# Patient Record
Sex: FEMALE | Race: WHITE | NOT HISPANIC OR LATINO | Employment: OTHER | RURAL
[De-identification: names, ages, dates, MRNs, and addresses within clinical notes are randomized per-mention and may not be internally consistent; named-entity substitution may affect disease eponyms.]

---

## 2022-07-06 ENCOUNTER — OFFICE VISIT (OUTPATIENT)
Dept: FAMILY MEDICINE CLINIC | Facility: CLINIC | Age: 62
End: 2022-07-06

## 2022-07-06 VITALS
OXYGEN SATURATION: 97 % | BODY MASS INDEX: 33.11 KG/M2 | HEIGHT: 66 IN | HEART RATE: 68 BPM | SYSTOLIC BLOOD PRESSURE: 126 MMHG | WEIGHT: 206 LBS | DIASTOLIC BLOOD PRESSURE: 80 MMHG

## 2022-07-06 DIAGNOSIS — E56.9 VITAMIN DEFICIENCY: ICD-10-CM

## 2022-07-06 DIAGNOSIS — E11.9 TYPE 2 DIABETES MELLITUS WITHOUT COMPLICATION, WITHOUT LONG-TERM CURRENT USE OF INSULIN: ICD-10-CM

## 2022-07-06 DIAGNOSIS — Z72.0 TOBACCO ABUSE: ICD-10-CM

## 2022-07-06 DIAGNOSIS — E78.2 MIXED HYPERLIPIDEMIA: ICD-10-CM

## 2022-07-06 DIAGNOSIS — R53.83 FATIGUE, UNSPECIFIED TYPE: ICD-10-CM

## 2022-07-06 DIAGNOSIS — J30.1 ALLERGIC RHINITIS DUE TO POLLEN, UNSPECIFIED SEASONALITY: ICD-10-CM

## 2022-07-06 DIAGNOSIS — I10 PRIMARY HYPERTENSION: Primary | ICD-10-CM

## 2022-07-06 PROBLEM — K57.92 DIVERTICULITIS: Status: ACTIVE | Noted: 2021-09-15

## 2022-07-06 PROBLEM — B37.0 CANDIDIASIS OF MOUTH: Status: ACTIVE | Noted: 2021-10-11

## 2022-07-06 PROBLEM — E66.9 OBESITY: Status: ACTIVE | Noted: 2019-11-22

## 2022-07-06 PROBLEM — N28.9 RENAL DISEASE: Status: ACTIVE | Noted: 2022-01-13

## 2022-07-06 PROBLEM — Z78.9 GOOD TOLERANCE FOR ACTIVITY: Status: ACTIVE | Noted: 2022-01-13

## 2022-07-06 PROBLEM — E78.5 HYPERLIPIDEMIA: Status: ACTIVE | Noted: 2019-10-31

## 2022-07-06 PROBLEM — K21.9 GASTROESOPHAGEAL REFLUX DISEASE: Status: ACTIVE | Noted: 2022-01-13

## 2022-07-06 PROBLEM — R91.8 MULTIPLE NODULES OF LUNG: Status: ACTIVE | Noted: 2020-12-17

## 2022-07-06 PROBLEM — Z98.890 S/P COLOSTOMY TAKEDOWN: Status: ACTIVE | Noted: 2022-03-03

## 2022-07-06 PROBLEM — F17.200 NICOTINE DEPENDENCE: Status: ACTIVE | Noted: 2020-01-13

## 2022-07-06 PROBLEM — I25.10 CORONARY ARTERIOSCLEROSIS: Status: ACTIVE | Noted: 2019-10-31

## 2022-07-06 PROCEDURE — 99214 OFFICE O/P EST MOD 30 MIN: CPT | Performed by: NURSE PRACTITIONER

## 2022-07-06 RX ORDER — BISACODYL 5 MG/1
TABLET, DELAYED RELEASE ORAL
COMMUNITY
End: 2022-07-06

## 2022-07-06 RX ORDER — FAMOTIDINE 20 MG/1
TABLET, FILM COATED ORAL
COMMUNITY
End: 2022-07-06

## 2022-07-06 RX ORDER — SCOLOPAMINE TRANSDERMAL SYSTEM 1 MG/1
PATCH, EXTENDED RELEASE TRANSDERMAL
COMMUNITY
End: 2022-07-06

## 2022-07-06 RX ORDER — LOSARTAN POTASSIUM 100 MG/1
TABLET ORAL
COMMUNITY
End: 2022-07-06

## 2022-07-06 RX ORDER — ATORVASTATIN CALCIUM 10 MG/1
TABLET, FILM COATED ORAL
COMMUNITY
End: 2022-07-06 | Stop reason: SDUPTHER

## 2022-07-06 RX ORDER — CHLORTHALIDONE 25 MG/1
25 TABLET ORAL DAILY
Qty: 90 TABLET | Refills: 1 | Status: SHIPPED | OUTPATIENT
Start: 2022-07-06 | End: 2023-02-16 | Stop reason: SDUPTHER

## 2022-07-06 RX ORDER — AMLODIPINE BESYLATE 5 MG/1
TABLET ORAL
COMMUNITY
End: 2022-07-06

## 2022-07-06 RX ORDER — VARENICLINE TARTRATE 1 MG/1
1 TABLET, FILM COATED ORAL 2 TIMES DAILY
Qty: 56 TABLET | Refills: 1 | Status: SHIPPED | OUTPATIENT
Start: 2022-08-03 | End: 2022-09-27

## 2022-07-06 RX ORDER — ONDANSETRON 4 MG/1
TABLET, ORALLY DISINTEGRATING ORAL
COMMUNITY
End: 2022-07-06

## 2022-07-06 RX ORDER — IRBESARTAN 300 MG/1
300 TABLET ORAL DAILY
COMMUNITY
Start: 2022-05-31 | End: 2022-07-06 | Stop reason: SDUPTHER

## 2022-07-06 RX ORDER — MONTELUKAST SODIUM 10 MG/1
10 TABLET ORAL NIGHTLY
Qty: 90 TABLET | Refills: 1 | Status: SHIPPED | OUTPATIENT
Start: 2022-07-06 | End: 2023-02-16 | Stop reason: SDUPTHER

## 2022-07-06 RX ORDER — IRBESARTAN 300 MG/1
300 TABLET ORAL DAILY
Qty: 90 TABLET | Refills: 1 | Status: SHIPPED | OUTPATIENT
Start: 2022-07-06 | End: 2022-11-16

## 2022-07-06 RX ORDER — MONTELUKAST SODIUM 10 MG/1
TABLET ORAL
COMMUNITY
End: 2022-07-06 | Stop reason: SDUPTHER

## 2022-07-06 RX ORDER — ATORVASTATIN CALCIUM 10 MG/1
10 TABLET, FILM COATED ORAL DAILY
Qty: 90 TABLET | Refills: 1 | Status: SHIPPED | OUTPATIENT
Start: 2022-07-06 | End: 2023-02-16 | Stop reason: SDUPTHER

## 2022-07-06 RX ORDER — METHOCARBAMOL 500 MG/1
500 TABLET, FILM COATED ORAL 4 TIMES DAILY
COMMUNITY
Start: 2022-03-03 | End: 2022-07-06

## 2022-07-06 RX ORDER — CHLORHEXIDINE GLUCONATE 4 G/100ML
SOLUTION TOPICAL
COMMUNITY
End: 2022-07-06

## 2022-07-06 RX ORDER — MELOXICAM 7.5 MG/1
TABLET ORAL
COMMUNITY
End: 2022-07-06

## 2022-07-06 RX ORDER — HYDROCODONE BITARTRATE AND ACETAMINOPHEN 7.5; 325 MG/1; MG/1
TABLET ORAL
COMMUNITY
End: 2022-07-06

## 2022-07-06 RX ORDER — CHLORTHALIDONE 25 MG/1
25 TABLET ORAL DAILY
COMMUNITY
Start: 2022-05-17 | End: 2022-07-06 | Stop reason: SDUPTHER

## 2022-07-06 RX ORDER — ATORVASTATIN CALCIUM 10 MG/1
10 TABLET, FILM COATED ORAL
COMMUNITY
Start: 2022-05-17 | End: 2022-07-06

## 2022-07-06 RX ORDER — POLYETHYLENE GLYCOL 3350 17 G/17G
POWDER, FOR SOLUTION ORAL
COMMUNITY
End: 2022-07-06

## 2022-07-06 NOTE — PROGRESS NOTES
"Chief Complaint  Med Refill and chantix request    Subjective          Aury Silverio presents to [facility]  Patient is here for refills on her medications.  She is doing well today with no complaints.  She would like to try Chantix for smoking cessation.  She has pain in her varicose veins in her legs at times.      Objective   Vital Signs:   /80   Pulse 68   Ht 167.6 cm (66\")   Wt 93.4 kg (206 lb)   SpO2 97%   BMI 33.25 kg/m²     Body mass index is 33.25 kg/m².    Review of Systems   Constitutional: Negative for fatigue and fever.   Respiratory: Negative for shortness of breath.    Cardiovascular: Negative for chest pain, palpitations and leg swelling.   Neurological: Negative for syncope.   Psychiatric/Behavioral: The patient is not nervous/anxious.           Current Outpatient Medications:   •  atorvastatin (LIPITOR) 10 MG tablet, Take 1 tablet by mouth Daily., Disp: 90 tablet, Rfl: 1  •  chlorthalidone (HYGROTON) 25 MG tablet, Take 1 tablet by mouth Daily., Disp: 90 tablet, Rfl: 1  •  irbesartan (AVAPRO) 300 MG tablet, Take 1 tablet by mouth Daily., Disp: 90 tablet, Rfl: 1  •  metFORMIN (GLUCOPHAGE) 500 MG tablet, Take 1 tablet by mouth Daily With Breakfast., Disp: 90 tablet, Rfl: 1  •  montelukast (SINGULAIR) 10 MG tablet, Take 1 tablet by mouth Every Night., Disp: 90 tablet, Rfl: 1  •  Potassium 99 MG tablet, Take  by mouth., Disp: , Rfl:   •  [START ON 8/3/2022] varenicline (Chantix Continuing Month Carter) 1 MG tablet, Take 1 tablet by mouth 2 (Two) Times a Day for 56 days., Disp: 56 tablet, Rfl: 1  •  varenicline (CHANTIX CARTER) 0.5 MG X 11 & 1 MG X 42 tablet, Take 0.5 mg po daily x 3 days, then 0.5 mg po bid x 4 days, then 1 mg po bid, Disp: 53 tablet, Rfl: 0      Allergies: Azithromycin    Physical Exam  Constitutional:       Appearance: Normal appearance.   HENT:      Head: Normocephalic.   Eyes:      Conjunctiva/sclera: Conjunctivae normal.      Pupils: Pupils " are equal, round, and reactive to light.   Cardiovascular:      Rate and Rhythm: Normal rate and regular rhythm.      Heart sounds: Normal heart sounds.   Pulmonary:      Effort: Pulmonary effort is normal.      Breath sounds: Normal breath sounds.   Abdominal:      Tenderness: There is no abdominal tenderness.   Musculoskeletal:         General: Normal range of motion.   Skin:     General: Skin is warm and dry.      Capillary Refill: Capillary refill takes less than 2 seconds.   Neurological:      General: No focal deficit present.      Mental Status: She is alert and oriented to person, place, and time.   Psychiatric:         Mood and Affect: Mood normal.         Behavior: Behavior normal.         Thought Content: Thought content normal.         Judgment: Judgment normal.          Result Review :                   Assessment and Plan    Diagnoses and all orders for this visit:    1. Primary hypertension (Primary)  Comments:  Continue current medications.  Monitor blood pressure and keep log.  We discussed diet and exercise.  Orders:  -     irbesartan (AVAPRO) 300 MG tablet; Take 1 tablet by mouth Daily.  Dispense: 90 tablet; Refill: 1  -     chlorthalidone (HYGROTON) 25 MG tablet; Take 1 tablet by mouth Daily.  Dispense: 90 tablet; Refill: 1    2. Mixed hyperlipidemia  Comments:  Continue current medications.  We discussed diet and exercise.  Return for fasting labs.  Orders:  -     atorvastatin (LIPITOR) 10 MG tablet; Take 1 tablet by mouth Daily.  Dispense: 90 tablet; Refill: 1  -     CBC & Differential; Future  -     Comprehensive Metabolic Panel; Future  -     Lipid Panel; Future    3. Allergic rhinitis due to pollen, unspecified seasonality  Comments:  Continue current medications.  Orders:  -     montelukast (SINGULAIR) 10 MG tablet; Take 1 tablet by mouth Every Night.  Dispense: 90 tablet; Refill: 1    4. Type 2 diabetes mellitus without complication, without long-term current use of insulin  ([facility])  Comments:  Continue current medications.  We discussed diet and exercise.  Return for fasting labs.  Orders:  -     metFORMIN (GLUCOPHAGE) 500 MG tablet; Take 1 tablet by mouth Daily With Breakfast.  Dispense: 90 tablet; Refill: 1  -     Hemoglobin A1c; Future    5. Vitamin deficiency  -     Vitamin B12; Future  -     Vitamin D 25 Hydroxy; Future  -     Folate; Future    6. Fatigue, unspecified type  -     TSH; Future    7. Tobacco abuse  Comments:  Chantix as directed  Orders:  -     varenicline (CHANTIX CARTER) 0.5 MG X 11 & 1 MG X 42 tablet; Take 0.5 mg po daily x 3 days, then 0.5 mg po bid x 4 days, then 1 mg po bid  Dispense: 53 tablet; Refill: 0  -     varenicline (Chantix Continuing Month Carter) 1 MG tablet; Take 1 tablet by mouth 2 (Two) Times a Day for 56 days.  Dispense: 56 tablet; Refill: 1                 Follow Up {Instructions Charge Capture  Follow-up Communications :23}  Return in about 6 months (around 1/6/2023) for Recheck.  Patient was given instructions and counseling regarding her condition or for health maintenance advice. Please see specific information pulled into the AVS if appropriate.     JUD Rizzo

## 2022-07-26 DIAGNOSIS — Z72.0 TOBACCO ABUSE: ICD-10-CM

## 2022-07-26 RX ORDER — VARENICLINE TARTRATE 1 MG/1
1 TABLET, FILM COATED ORAL 2 TIMES DAILY
Qty: 56 TABLET | Refills: 1 | OUTPATIENT
Start: 2022-07-26 | End: 2022-09-20

## 2022-09-14 ENCOUNTER — TELEPHONE (OUTPATIENT)
Dept: FAMILY MEDICINE CLINIC | Facility: CLINIC | Age: 62
End: 2022-09-14

## 2022-09-23 ENCOUNTER — TELEPHONE (OUTPATIENT)
Dept: FAMILY MEDICINE CLINIC | Facility: CLINIC | Age: 62
End: 2022-09-23

## 2022-09-27 DIAGNOSIS — Z72.0 TOBACCO ABUSE: ICD-10-CM

## 2022-09-27 RX ORDER — VARENICLINE TARTRATE 1 MG/1
1 TABLET, FILM COATED ORAL 2 TIMES DAILY
Qty: 60 TABLET | Refills: 1 | Status: SHIPPED | OUTPATIENT
Start: 2022-09-27 | End: 2022-10-20

## 2022-10-20 DIAGNOSIS — Z72.0 TOBACCO ABUSE: ICD-10-CM

## 2022-10-20 RX ORDER — VARENICLINE TARTRATE 1 MG/1
TABLET, FILM COATED ORAL
Qty: 56 TABLET | Refills: 1 | Status: SHIPPED | OUTPATIENT
Start: 2022-10-20 | End: 2022-11-12

## 2022-10-26 ENCOUNTER — LAB (OUTPATIENT)
Dept: FAMILY MEDICINE CLINIC | Facility: CLINIC | Age: 62
End: 2022-10-26

## 2022-10-26 DIAGNOSIS — E11.9 TYPE 2 DIABETES MELLITUS WITHOUT COMPLICATION, WITHOUT LONG-TERM CURRENT USE OF INSULIN: ICD-10-CM

## 2022-10-26 DIAGNOSIS — R53.83 FATIGUE, UNSPECIFIED TYPE: ICD-10-CM

## 2022-10-26 DIAGNOSIS — E78.2 MIXED HYPERLIPIDEMIA: ICD-10-CM

## 2022-10-26 DIAGNOSIS — E56.9 VITAMIN DEFICIENCY: ICD-10-CM

## 2022-10-26 PROCEDURE — 36415 COLL VENOUS BLD VENIPUNCTURE: CPT | Performed by: NURSE PRACTITIONER

## 2022-10-27 LAB
25(OH)D3+25(OH)D2 SERPL-MCNC: 23 NG/ML (ref 30–100)
ALBUMIN SERPL-MCNC: 4.1 G/DL (ref 3.8–4.8)
ALBUMIN/GLOB SERPL: 1.7 {RATIO} (ref 1.2–2.2)
ALP SERPL-CCNC: 69 IU/L (ref 44–121)
ALT SERPL-CCNC: 16 IU/L (ref 0–32)
AST SERPL-CCNC: 18 IU/L (ref 0–40)
BASOPHILS # BLD AUTO: 0.1 X10E3/UL (ref 0–0.2)
BASOPHILS NFR BLD AUTO: 1 %
BILIRUB SERPL-MCNC: 0.4 MG/DL (ref 0–1.2)
BUN SERPL-MCNC: 20 MG/DL (ref 8–27)
BUN/CREAT SERPL: 20 (ref 12–28)
CALCIUM SERPL-MCNC: 9.4 MG/DL (ref 8.7–10.3)
CHLORIDE SERPL-SCNC: 99 MMOL/L (ref 96–106)
CHOLEST SERPL-MCNC: 129 MG/DL (ref 100–199)
CO2 SERPL-SCNC: 23 MMOL/L (ref 20–29)
CREAT SERPL-MCNC: 1.01 MG/DL (ref 0.57–1)
EGFRCR SERPLBLD CKD-EPI 2021: 62 ML/MIN/1.73
EOSINOPHIL # BLD AUTO: 0.1 X10E3/UL (ref 0–0.4)
EOSINOPHIL NFR BLD AUTO: 2 %
ERYTHROCYTE [DISTWIDTH] IN BLOOD BY AUTOMATED COUNT: 15.3 % (ref 11.7–15.4)
FOLATE SERPL-MCNC: 8.5 NG/ML
GLOBULIN SER CALC-MCNC: 2.4 G/DL (ref 1.5–4.5)
GLUCOSE SERPL-MCNC: 93 MG/DL (ref 70–99)
HBA1C MFR BLD: 6 % (ref 4.8–5.6)
HCT VFR BLD AUTO: 40.6 % (ref 34–46.6)
HDLC SERPL-MCNC: 42 MG/DL
HGB BLD-MCNC: 12.9 G/DL (ref 11.1–15.9)
IMM GRANULOCYTES # BLD AUTO: 0 X10E3/UL (ref 0–0.1)
IMM GRANULOCYTES NFR BLD AUTO: 0 %
LDLC SERPL CALC-MCNC: 70 MG/DL (ref 0–99)
LYMPHOCYTES # BLD AUTO: 2.5 X10E3/UL (ref 0.7–3.1)
LYMPHOCYTES NFR BLD AUTO: 34 %
MCH RBC QN AUTO: 25.7 PG (ref 26.6–33)
MCHC RBC AUTO-ENTMCNC: 31.8 G/DL (ref 31.5–35.7)
MCV RBC AUTO: 81 FL (ref 79–97)
MONOCYTES # BLD AUTO: 0.5 X10E3/UL (ref 0.1–0.9)
MONOCYTES NFR BLD AUTO: 7 %
NEUTROPHILS # BLD AUTO: 4.2 X10E3/UL (ref 1.4–7)
NEUTROPHILS NFR BLD AUTO: 56 %
PLATELET # BLD AUTO: 256 X10E3/UL (ref 150–450)
POTASSIUM SERPL-SCNC: 4.3 MMOL/L (ref 3.5–5.2)
PROT SERPL-MCNC: 6.5 G/DL (ref 6–8.5)
RBC # BLD AUTO: 5.01 X10E6/UL (ref 3.77–5.28)
SODIUM SERPL-SCNC: 140 MMOL/L (ref 134–144)
TRIGL SERPL-MCNC: 90 MG/DL (ref 0–149)
TSH SERPL DL<=0.005 MIU/L-ACNC: 3.44 UIU/ML (ref 0.45–4.5)
VIT B12 SERPL-MCNC: 354 PG/ML (ref 232–1245)
VLDLC SERPL CALC-MCNC: 17 MG/DL (ref 5–40)
WBC # BLD AUTO: 7.3 X10E3/UL (ref 3.4–10.8)

## 2022-11-11 DIAGNOSIS — Z72.0 TOBACCO ABUSE: ICD-10-CM

## 2022-11-12 RX ORDER — VARENICLINE TARTRATE 1 MG/1
1 TABLET, FILM COATED ORAL 2 TIMES DAILY
Qty: 60 TABLET | Refills: 1 | Status: SHIPPED | OUTPATIENT
Start: 2022-11-12 | End: 2022-12-07

## 2022-11-16 DIAGNOSIS — I10 PRIMARY HYPERTENSION: ICD-10-CM

## 2022-11-16 RX ORDER — IRBESARTAN 300 MG/1
TABLET ORAL
Qty: 90 TABLET | Refills: 1 | Status: SHIPPED | OUTPATIENT
Start: 2022-11-16 | End: 2023-02-16 | Stop reason: SDUPTHER

## 2022-12-07 DIAGNOSIS — Z72.0 TOBACCO ABUSE: ICD-10-CM

## 2022-12-07 RX ORDER — VARENICLINE TARTRATE 1 MG/1
TABLET, FILM COATED ORAL
Qty: 60 TABLET | Refills: 1 | Status: SHIPPED | OUTPATIENT
Start: 2022-12-07 | End: 2023-02-16 | Stop reason: SDUPTHER

## 2022-12-29 DIAGNOSIS — Z72.0 TOBACCO ABUSE: ICD-10-CM

## 2022-12-29 RX ORDER — VARENICLINE TARTRATE 1 MG/1
TABLET, FILM COATED ORAL
Qty: 56 TABLET | Refills: 1 | OUTPATIENT
Start: 2022-12-29

## 2023-02-11 DIAGNOSIS — E11.9 TYPE 2 DIABETES MELLITUS WITHOUT COMPLICATION, WITHOUT LONG-TERM CURRENT USE OF INSULIN: ICD-10-CM

## 2023-02-16 ENCOUNTER — OFFICE VISIT (OUTPATIENT)
Dept: FAMILY MEDICINE CLINIC | Facility: CLINIC | Age: 63
End: 2023-02-16
Payer: COMMERCIAL

## 2023-02-16 VITALS
OXYGEN SATURATION: 98 % | WEIGHT: 234 LBS | DIASTOLIC BLOOD PRESSURE: 90 MMHG | HEART RATE: 71 BPM | BODY MASS INDEX: 37.61 KG/M2 | HEIGHT: 66 IN | SYSTOLIC BLOOD PRESSURE: 132 MMHG

## 2023-02-16 DIAGNOSIS — E11.9 TYPE 2 DIABETES MELLITUS WITHOUT COMPLICATION, WITHOUT LONG-TERM CURRENT USE OF INSULIN: ICD-10-CM

## 2023-02-16 DIAGNOSIS — J30.1 ALLERGIC RHINITIS DUE TO POLLEN, UNSPECIFIED SEASONALITY: ICD-10-CM

## 2023-02-16 DIAGNOSIS — M54.50 LUMBAR PAIN: ICD-10-CM

## 2023-02-16 DIAGNOSIS — Z72.0 TOBACCO ABUSE: ICD-10-CM

## 2023-02-16 DIAGNOSIS — E78.2 MIXED HYPERLIPIDEMIA: Primary | ICD-10-CM

## 2023-02-16 DIAGNOSIS — Z12.31 ENCOUNTER FOR SCREENING MAMMOGRAM FOR MALIGNANT NEOPLASM OF BREAST: ICD-10-CM

## 2023-02-16 DIAGNOSIS — I10 PRIMARY HYPERTENSION: ICD-10-CM

## 2023-02-16 PROBLEM — B37.0 CANDIDIASIS OF MOUTH: Status: RESOLVED | Noted: 2021-10-11 | Resolved: 2023-02-16

## 2023-02-16 PROBLEM — Z78.9 GOOD TOLERANCE FOR ACTIVITY: Status: RESOLVED | Noted: 2022-01-13 | Resolved: 2023-02-16

## 2023-02-16 PROCEDURE — 99214 OFFICE O/P EST MOD 30 MIN: CPT | Performed by: NURSE PRACTITIONER

## 2023-02-16 RX ORDER — VARENICLINE TARTRATE 1 MG/1
1 TABLET, FILM COATED ORAL 2 TIMES DAILY
Qty: 60 TABLET | Refills: 1 | Status: SHIPPED | OUTPATIENT
Start: 2023-02-16

## 2023-02-16 RX ORDER — CHLORTHALIDONE 25 MG/1
25 TABLET ORAL DAILY
Qty: 90 TABLET | Refills: 1 | Status: SHIPPED | OUTPATIENT
Start: 2023-02-16

## 2023-02-16 RX ORDER — MONTELUKAST SODIUM 10 MG/1
10 TABLET ORAL NIGHTLY
Qty: 90 TABLET | Refills: 1 | Status: SHIPPED | OUTPATIENT
Start: 2023-02-16

## 2023-02-16 RX ORDER — ATORVASTATIN CALCIUM 10 MG/1
10 TABLET, FILM COATED ORAL DAILY
Qty: 90 TABLET | Refills: 1 | Status: SHIPPED | OUTPATIENT
Start: 2023-02-16

## 2023-02-16 RX ORDER — IRBESARTAN 300 MG/1
300 TABLET ORAL DAILY
Qty: 90 TABLET | Refills: 1 | Status: SHIPPED | OUTPATIENT
Start: 2023-02-16

## 2023-02-16 NOTE — PROGRESS NOTES
"Chief Complaint  Hypertension    Subjective          Aury Silverio presents to [facility]  History of Present Illness  Pt is here to follow up on HTN, hyperlipidemia, allergies, and tobacco abuse. She states that her conditions are controlled. She has had low back pain that radiates to her legs at times.       Objective   Vital Signs:   /90   Pulse 71   Ht 167.6 cm (66\")   Wt 106 kg (234 lb)   SpO2 98%   BMI 37.77 kg/m²     Body mass index is 37.77 kg/m².    Review of Systems   Constitutional: Negative for fatigue and fever.   Respiratory: Negative for shortness of breath.    Cardiovascular: Negative for chest pain, palpitations and leg swelling.   Musculoskeletal: Positive for back pain.   Neurological: Negative for syncope.   Psychiatric/Behavioral: The patient is not nervous/anxious.           Current Outpatient Medications:   •  atorvastatin (LIPITOR) 10 MG tablet, Take 1 tablet by mouth Daily., Disp: 90 tablet, Rfl: 1  •  chlorthalidone (HYGROTON) 25 MG tablet, Take 1 tablet by mouth Daily., Disp: 90 tablet, Rfl: 1  •  Cholecalciferol 50 MCG (2000 UT) capsule, Take 2,000 Units by mouth., Disp: , Rfl:   •  irbesartan (AVAPRO) 300 MG tablet, Take 1 tablet by mouth Daily., Disp: 90 tablet, Rfl: 1  •  metFORMIN (GLUCOPHAGE) 500 MG tablet, Take 1 tablet by mouth Daily With Breakfast., Disp: 90 tablet, Rfl: 1  •  montelukast (SINGULAIR) 10 MG tablet, Take 1 tablet by mouth Every Night., Disp: 90 tablet, Rfl: 1  •  Potassium 99 MG tablet, Take  by mouth., Disp: , Rfl:   •  varenicline (CHANTIX) 1 MG tablet, Take 1 tablet by mouth 2 (Two) Times a Day., Disp: 60 tablet, Rfl: 1      Allergies: Azithromycin    Physical Exam  Constitutional:       Appearance: Normal appearance.   HENT:      Head: Normocephalic.   Eyes:      Conjunctiva/sclera: Conjunctivae normal.      Pupils: Pupils are equal, round, and reactive to light.   Cardiovascular:      Rate and Rhythm: Normal rate " and regular rhythm.      Heart sounds: Normal heart sounds.   Pulmonary:      Effort: Pulmonary effort is normal.      Breath sounds: Normal breath sounds.   Abdominal:      Tenderness: There is no abdominal tenderness.   Musculoskeletal:         General: Normal range of motion.      Comments: Lumbar tenderness   Skin:     General: Skin is warm and dry.      Capillary Refill: Capillary refill takes less than 2 seconds.   Neurological:      General: No focal deficit present.      Mental Status: She is alert and oriented to person, place, and time.   Psychiatric:         Mood and Affect: Mood normal.         Behavior: Behavior normal.         Thought Content: Thought content normal.         Judgment: Judgment normal.          Result Review :                   Assessment and Plan    Diagnoses and all orders for this visit:    1. Mixed hyperlipidemia (Primary)  Comments:  Continue current medications.  We discussed diet and exercise.    Orders:  -     atorvastatin (LIPITOR) 10 MG tablet; Take 1 tablet by mouth Daily.  Dispense: 90 tablet; Refill: 1    2. Primary hypertension  Comments:  Continue current medications.  Monitor blood pressure and keep log.  We discussed diet and exercise.  Orders:  -     chlorthalidone (HYGROTON) 25 MG tablet; Take 1 tablet by mouth Daily.  Dispense: 90 tablet; Refill: 1  -     irbesartan (AVAPRO) 300 MG tablet; Take 1 tablet by mouth Daily.  Dispense: 90 tablet; Refill: 1    3. Type 2 diabetes mellitus without complication, without long-term current use of insulin ([facility])  Comments:  Continue current medications.  We discussed diet and exercise.   Orders:  -     metFORMIN (GLUCOPHAGE) 500 MG tablet; Take 1 tablet by mouth Daily With Breakfast.  Dispense: 90 tablet; Refill: 1    4. Allergic rhinitis due to pollen, unspecified seasonality  Comments:  Continue current medications.  Orders:  -     montelukast (SINGULAIR) 10 MG tablet; Take 1 tablet by mouth Every Night.  Dispense: 90 tablet;  Refill: 1    5. Tobacco abuse  Comments:  Chantix as directed  Orders:  -     varenicline (CHANTIX) 1 MG tablet; Take 1 tablet by mouth 2 (Two) Times a Day.  Dispense: 60 tablet; Refill: 1    6. Lumbar pain  Comments:  XRs done. Apply ice to painful areas and ROM stretching. We will order MRI if not improving.   Orders:  -     XR Spine Lumbar 2 or 3 View (In Office)    7. Encounter for screening mammogram for malignant neoplasm of breast  -     Mammo Screening Digital Tomosynthesis Bilateral With CAD; Future                Follow Up   No follow-ups on file.  Patient was given instructions and counseling regarding her condition or for health maintenance advice. Please see specific information pulled into the AVS if appropriate.     Silvia Schulz, APRN

## 2023-02-17 NOTE — PROGRESS NOTES
Your x-ray showed some degenerative disc disease and scoliosis in your low back.  Would you like to order an MRI to get a better look?  We could also try some PT to see if this helps your pain.

## 2023-02-22 DIAGNOSIS — M54.50 LUMBAR PAIN: Primary | ICD-10-CM

## 2023-03-14 DIAGNOSIS — Z72.0 TOBACCO ABUSE: ICD-10-CM

## 2023-03-15 RX ORDER — VARENICLINE TARTRATE 1 MG/1
TABLET, FILM COATED ORAL
Qty: 60 TABLET | Refills: 1 | OUTPATIENT
Start: 2023-03-15

## 2023-03-24 ENCOUNTER — TELEPHONE (OUTPATIENT)
Dept: FAMILY MEDICINE CLINIC | Facility: CLINIC | Age: 63
End: 2023-03-24
Payer: COMMERCIAL

## 2023-03-24 DIAGNOSIS — M54.50 LUMBAR PAIN: Primary | ICD-10-CM

## 2023-03-24 NOTE — TELEPHONE ENCOUNTER
Caller: MONISHA WITH [employer]    Relationship: Other    Best call back number: [phone] REF: [id number]    What is the best time to reach you: ANY    Who are you requesting to speak with (clinical staff, provider,  specific staff member): STAFF/CLINICAL    What was the call regarding: DIANEL[facility] CALLING TO STATE THAT THE MRI FOR LUMBAR SPINE HAS BEEN DENIED, BUT [facility] CAN SET UP A PEER TO PEER, IF NEEDED.     Do you require a callback: IF NEEDED.

## 2023-03-27 NOTE — TELEPHONE ENCOUNTER
"HUB TO READ    \"Will you let pt know that her insurance denied her MRI. We could try PT first and see if they will approve after that or we could refer her to ortho and see if they will approve if ordered by them. Thanks!\"  Left vm.  "

## 2023-03-27 NOTE — TELEPHONE ENCOUNTER
Will you let pt know that her insurance denied her MRI. We could try PT first and see if they will approve after that or we could refer her to ortho and see if they will approve if ordered by them. Thanks!

## 2023-03-29 NOTE — TELEPHONE ENCOUNTER
Patient will take an order for PT. She wants the order to go to [facility]. Let me know once the order is in and I can fax it to them. She has been instructed to call us once she completes the 6 weeks of physical therapy if she wants to try for the MRI again.

## 2023-04-12 ENCOUNTER — APPOINTMENT (OUTPATIENT)
Dept: MRI IMAGING | Facility: HOSPITAL | Age: 63
End: 2023-04-12
Payer: COMMERCIAL

## 2023-04-12 ENCOUNTER — HOSPITAL ENCOUNTER (OUTPATIENT)
Dept: MAMMOGRAPHY | Facility: HOSPITAL | Age: 63
Discharge: HOME | End: 2023-04-12
Admitting: NURSE PRACTITIONER
Payer: COMMERCIAL

## 2023-04-12 DIAGNOSIS — Z12.31 ENCOUNTER FOR SCREENING MAMMOGRAM FOR MALIGNANT NEOPLASM OF BREAST: ICD-10-CM

## 2023-04-12 PROCEDURE — 77067 SCR MAMMO BI INCL CAD: CPT

## 2023-04-12 PROCEDURE — 77063 BREAST TOMOSYNTHESIS BI: CPT

## 2023-05-04 DIAGNOSIS — M54.50 LUMBAR PAIN: Primary | ICD-10-CM

## 2023-05-19 DIAGNOSIS — Z72.0 TOBACCO ABUSE: ICD-10-CM

## 2023-05-19 RX ORDER — VARENICLINE TARTRATE 1 MG/1
TABLET, FILM COATED ORAL
Qty: 60 TABLET | Refills: 1 | Status: SHIPPED | OUTPATIENT
Start: 2023-05-19

## 2023-06-14 DIAGNOSIS — Z72.0 TOBACCO ABUSE: ICD-10-CM

## 2023-06-15 RX ORDER — VARENICLINE TARTRATE 1 MG/1
TABLET, FILM COATED ORAL
Qty: 60 TABLET | Refills: 1 | Status: SHIPPED | OUTPATIENT
Start: 2023-06-15

## 2023-08-10 DIAGNOSIS — M51.36 DDD (DEGENERATIVE DISC DISEASE), LUMBAR: Primary | ICD-10-CM

## 2023-08-10 DIAGNOSIS — Z72.0 TOBACCO ABUSE: ICD-10-CM

## 2023-08-11 ENCOUNTER — TELEPHONE (OUTPATIENT)
Dept: NEUROSURGERY | Facility: CLINIC | Age: 63
End: 2023-08-11
Payer: MEDICARE

## 2023-08-11 DIAGNOSIS — M51.36 DDD (DEGENERATIVE DISC DISEASE), LUMBAR: Primary | ICD-10-CM

## 2023-08-11 RX ORDER — VARENICLINE TARTRATE 1 MG/1
TABLET, FILM COATED ORAL
Qty: 60 TABLET | Refills: 1 | Status: SHIPPED | OUTPATIENT
Start: 2023-08-11

## 2023-08-15 ENCOUNTER — OFFICE VISIT (OUTPATIENT)
Dept: NEUROSURGERY | Facility: CLINIC | Age: 63
End: 2023-08-15
Payer: MEDICARE

## 2023-08-15 VITALS — BODY MASS INDEX: 39.7 KG/M2 | HEIGHT: 66 IN | WEIGHT: 247 LBS

## 2023-08-15 DIAGNOSIS — M54.9 MECHANICAL BACK PAIN: Primary | ICD-10-CM

## 2023-08-15 DIAGNOSIS — M51.36 DDD (DEGENERATIVE DISC DISEASE), LUMBAR: ICD-10-CM

## 2023-08-15 DIAGNOSIS — M48.062 SPINAL STENOSIS, LUMBAR REGION, WITH NEUROGENIC CLAUDICATION: ICD-10-CM

## 2023-08-15 NOTE — PROGRESS NOTES
"    Office Note     Name: Aury Sliverio    : 1960     MRN: [id number]     PCP: Silvia Schulz APRN    Chief Complaint  Low back pain with walking and standing intolerance.    Subjective     History of Present Illness:  Ms. Silverio is a 63-year-old woman presenting with a several year history of progressively worsening low back and bilateral leg pain.  Symptoms extend into bilateral lower extremities, left worse than right.  She reports a dull/ache sensation extending down into the buttock and posterior thigh stopping at the knees.  Symptoms are exacerbated during prolonged times of standing and walking.  Particularly when she is standing doing dishes at home symptoms are exacerbated.  Lying flat or sitting in a certain position in a recliner gives her relief.  Symptoms tend to come and go.  She tried physical therapy for 6-week with no relief.  She reports of \"muscle spasms \"along the low back area.  She denies bowel or bladder dysfunctions, saddle anesthesia, or weakness. She does have a history type 2 DM. No other complaints this time.    Review of Systems:   Review of Systems    The patient's past medical history, past surgical history, family history, and social history have been reviewed at length in the electronic medical record.       Past Medical History:   Past Medical History:   Diagnosis Date    Allergies     Diabetes     Hypercholesterolemia     Hypertension     Low back pain     Lumbosacral disc disease        Past Surgical History:   Past Surgical History:   Procedure Laterality Date    COLECTOMY PARTIAL / TOTAL         Family History:   Family History   Problem Relation Age of Onset    Breast cancer Sister 50    Cancer Mother     Heart disease Father     Ovarian cancer Neg Hx        Social History:   Social History     Socioeconomic History    Marital status:    Tobacco Use    Smoking status: Former     Packs/day: 0.50     Years: 44.00     Pack years: 22.00     Types: " "Cigarettes     Start date: 1978     Quit date: 2022     Years since quittin.1    Smokeless tobacco: Never   Vaping Use    Vaping Use: Never used   Substance and Sexual Activity    Alcohol use: Yes     Comment: rare    Drug use: Never    Sexual activity: Yes     Partners: Male       Immunizations:   Immunization History   Administered Date(s) Administered    COVID-19 (MODERNA) 1st,2nd,3rd Dose Monovalent 2021, 2021    COVID-19 (MODERNA) Monovalent Original Booster 2022    Fluzone >6mos 2022        Medications:     Current Outpatient Medications:     atorvastatin (LIPITOR) 10 MG tablet, Take 1 tablet by mouth Daily., Disp: 90 tablet, Rfl: 1    chlorthalidone (HYGROTON) 25 MG tablet, Take 1 tablet by mouth Daily., Disp: 90 tablet, Rfl: 1    Cholecalciferol 50 MCG ( UT) capsule, Take 1 capsule by mouth., Disp: , Rfl:     irbesartan (AVAPRO) 300 MG tablet, Take 1 tablet by mouth Daily., Disp: 90 tablet, Rfl: 1    metFORMIN (GLUCOPHAGE) 500 MG tablet, Take 1 tablet by mouth Daily With Breakfast., Disp: 90 tablet, Rfl: 1    montelukast (SINGULAIR) 10 MG tablet, Take 1 tablet by mouth Every Night., Disp: 90 tablet, Rfl: 1    Potassium 99 MG tablet, Take  by mouth., Disp: , Rfl:     varenicline (CHANTIX) 1 MG tablet, TAKE 1 TABLET BY MOUTH TWICE A DAY, Disp: 60 tablet, Rfl: 1    Allergies:   Allergies   Allergen Reactions    Azithromycin GI Intolerance       Objective     Vital Signs  Ht 167.6 cm (66\")   Wt 112 kg (247 lb)   BMI 39.87 kg/mý   Estimated body mass index is 39.87 kg/mý as calculated from the following:    Height as of this encounter: 167.6 cm (66\").    Weight as of this encounter: 112 kg (247 lb).    Physical Exam   Constitutional: Patient is well-developed and appears stated age. Pleasant and   cooperative. Appears in no acute distress.   HENT:   Head normocephalic and atraumatic.  Eyes: Pupils are equal, round, and reactive to light.   Musculoskeletal:     Strength is " intact in upper and lower extremities to direct testing.     Station and gait are normal.     Straight leg raising is negative.      Range of motion in the low back somewhat limited.  Tenderness to palpation is observed along the left low back area.  Neurologic:     Muscle tone is normal throughout.     Coordination is intact.     No clonus is elicited.      Deep tendon reflexes: 1+ and symmetrical.     Sensation is intact to light touch throughout.     Patient is oriented to person, place, and time.  Psychiatric: Normal behavior and thought content.  Skin: Clean, dry, and intact.     Tobacco Use: Medium Risk    Smoking Tobacco Use: Former    Smokeless Tobacco Use: Never    Passive Exposure: Not on file        Body mass index is 39.87 kg/mý.    Fall Risk Assessment was completed, and patient is at low risk for falls.    Assessment and Plan     Medical Decision Making     Data Review:   (All imaging independently reviewed unless stated otherwise.)   MRI lumbar spine without contrast dated 8/10/2023 degenerative changes throughout the lumbar spine.  At L4-5 and L5-S1 there is a subtle disc bulge present with associated osteophytes along with mild to moderate left and right neural foraminal narrowing.  Axial images at L1-2 and L2-3  levels were not obtained.    Diagnosis:  Lumbar stenosis with neurogenic claudication  Mechanical low back pain   Degenerative disc disease, lumbar     Treatment Options:   Ms. Silverio presents today with worsening low back and bilateral leg pain. She has tried PT to no avail in the past. I have referred her to PM for a possible round or two of VIVIAN. She will also obtain plain films of the lumbar spine flexion/extension to assess for possible instability. Encouraged conservative measures in the interim. She will follow-up in 6-8 weeks to check on her progress.          Diagnosis Plan   1. Mechanical back pain  XR Spine Lumbar Flex & Ext    Ambulatory Referral to [facility] Management Clinic       2. DDD (degenerative disc disease), lumbar  XR Spine Lumbar Flex & Ext    Ambulatory Referral to Pain Management Clinic      3. Spinal stenosis, lumbar region, with neurogenic claudication  XR Spine Lumbar Flex & Ext    Ambulatory Referral to Pain Management Clinic              Wilian Mazariegos PA-C  [facility]  [address] [address] [phone]

## 2023-08-17 DIAGNOSIS — E78.2 MIXED HYPERLIPIDEMIA: ICD-10-CM

## 2023-08-17 DIAGNOSIS — E11.9 TYPE 2 DIABETES MELLITUS WITHOUT COMPLICATION, WITHOUT LONG-TERM CURRENT USE OF INSULIN: ICD-10-CM

## 2023-08-17 DIAGNOSIS — J30.1 ALLERGIC RHINITIS DUE TO POLLEN, UNSPECIFIED SEASONALITY: ICD-10-CM

## 2023-08-17 DIAGNOSIS — I10 PRIMARY HYPERTENSION: ICD-10-CM

## 2023-08-17 RX ORDER — CHLORTHALIDONE 25 MG/1
TABLET ORAL
Qty: 90 TABLET | Refills: 1 | Status: SHIPPED | OUTPATIENT
Start: 2023-08-17

## 2023-08-17 RX ORDER — MONTELUKAST SODIUM 10 MG/1
TABLET ORAL
Qty: 90 TABLET | Refills: 1 | Status: SHIPPED | OUTPATIENT
Start: 2023-08-17

## 2023-08-17 RX ORDER — ATORVASTATIN CALCIUM 10 MG/1
TABLET, FILM COATED ORAL
Qty: 90 TABLET | Refills: 1 | Status: SHIPPED | OUTPATIENT
Start: 2023-08-17

## 2023-08-22 ENCOUNTER — TELEPHONE (OUTPATIENT)
Dept: FAMILY MEDICINE CLINIC | Facility: CLINIC | Age: 63
End: 2023-08-22
Payer: MEDICARE

## 2023-08-22 NOTE — TELEPHONE ENCOUNTER
Patient came in today with an order from neuro for lumbar spine flex and ext. The images were horrible, unknown at this time if it is the machine not functioning appropriately. I adjusted the KV and mAs and the image was still not suitable to send to radiologist. Patient is going to the [place] office to have her images done.

## 2023-08-30 NOTE — PROGRESS NOTES
"Chief Complaint: \"Lower back pain. Pain in my legs.\"      History of Present Illness:   Patient: Ms. Aury Silverio, 63 y.o. female   Referring Physician: Wilian Mazariegos PA-C  Reason for Referral: Consultation for chronic intractable lower back pain.   Pain History: Patient reports a 10-year history of chronic intractable lower back pain, which began without incident. Aury Silverio reports a several year history of progressively worsening low back and bilateral leg pain radiating into the lower extremities, left worse than right. MRI of the lumbar spine without contrast on 6/16/2023 revealed multilevel spondylosis.  Axial images were obtained from L3-L4 down to L5-S1.  At L4-L5, disc bulge, facet hypertrophy, ligamentum flavum hypertrophy contributes to moderate canal stenosis and lateral recess stenosis. Mild left foraminal stenosis.  L5-S1: Disc bulge, facet hypertrophy, mild canal stenosis.  Mild right foraminal stenosis. Flexion and extension x-rays of the lumbar spine on 8/22/2023 revealed diffuse bone demineralization.  Multilevel degenerative changes.  There is no evidence of lumbar instability with flexion or extension. Pain has progressed in intensity over the past year. Aury Silverio has failed to obtain pain relief with conservative measures for more than 12 months including oral analgesics, topical analgesics, ice, heat, physical therapy (completed within the past 6 months), physical therapist directed home exercise program HEP (ongoing), to name a few  Pain Description: Constant lower back pain with intermittent exacerbation, described as aching, dull, sharp and throbbing sensation.   Radiation of Pain: The pain radiates into the gluteal region, posterior thigh, calves LT>RT  Pain intensity today: 5/10   Average pain intensity last week: 7/10  Pain intensity ranges from: 5/10 to 8/10  Aggravating factors: Pain increases with lifting, pushing, pulling, movement, " bending forward arching the back, twisting, protracted sitting, standing, walking. Patient describes neurogenic claudication.  Patient does not use a cane or walker  Alleviating factors: Pain decreases with sitting down, lying down on a recliner  Associated Symptoms:   Patient reports pain, numbness, and weakness in the lower extremities.   Patient denies any new bladder or bowel problems.   Patient reports difficulties with her balance but denies recent falls.   Pain interferes with ADLs, general activities (ability to walk, stand, transition from different positions), and affects patient's quality of life  Pain interferes with sleep falling asleep and causing sleep fragmentation   Muscle spasms; LB  Stiffness: LB    Review of previous therapies and additional medical records:  Aury Silverio has already failed the following measures, including:   Conservative Measures: Oral analgesics, topical analgesics, ice, heat, physical therapy (completed within the past 6 months), physical therapist directed home exercise program HEP (ongoing)  Interventional Measures: None  Surgical Measures: No history of previous cervical spine, lumbar spine or hip surgery   Aury Silverio underwent neurosurgical consultation with Wilian Mazariegos PA-C on 08/15/2023, and was found not to be a surgical candidate.  Aury Silverio presents with significant comorbidities including HTN, Type 2 DBTs,  HLP, GERD, CAD, former smoker  In terms of current analgesics, Aury Silverio takes:  Tylenol. Patient is on Chantix  I have reviewed Arun Report consistent with medication reconciliation.  SOAPP/ORT: Low Risk     PHQ-2 Depression Screening  Little interest or pleasure in doing things? 1-->several days   Feeling down, depressed, or hopeless? 0-->not at all   PHQ-2 Total Score 1     Pain Self-Efficacy Questionnaire (PSEQ)  ITEM 09-05  2023        I can enjoy things despite the pain. 2        I  can do most of the household chores (tidying up, washing dishes, etc), despite the pain. 3        I can socialize with my friends or family members as often as I used to do, despite the pain. 3        I can cope with my pain in most situations. 2        I can do some form of work, despite the pain (includes housework, paid, and unpaid work). 2        I can still do many of the things I enjoy doing, such as hobbies or leisure activity despite pain. 2        I can cope with my pain without medications. 2        I can accomplish most of my goals in life despite the pain. 2        I can live in a normal lifestyle, despite the pain. 2        I can gradually become more active, despite the pain. 1        TOTAL SCORE 21/60            Global Pain Scale 09-05 2023          Pain 15          Feelings 3          Clinical outcomes 11          Activities 11          GPS Total: 40            The [place] Back Pain Disability Scale   DATE 09-05 2023          Sleep through the night 3          Turn over in bed 2          Get out of bed 2          Make your bed 1          Put on socks (pantyhose) 3          Ride in a car 2          Sit in a chair for several hours 2          Stand up for 20-30 minutes 3          Climb one flight of stairs 3          Walk a few blocks (200-300 yards)  3          Walk several miles 4          Run one block (about 50 yards) 5          Take food out of the refrigerator 1          Reach up to high shelves 2          Move a chair 2          Pull or push heavy doors 2          Bend over to clean the bathtub 3          Throw a ball 2          Carry two bags of groceries 2          Lift and carry a heavy suitcase 3          Total score 49            Review of Diagnostic Studies:   I have independently reviewed and interpreted the images with the patient and used the images and a tridimensional spine model to explain findings. I have also reviewed the reports.  MRI of the lumbar spine without contrast on  6/16/2023 revealed multilevel spondylosis.  Axial images were obtained from L3-4 down to L5-S1.  At L4-L5, the combination of disc bulge, facet hypertrophy, ligamentum flavum hypertrophy contributes to moderate canal stenosis and lateral recess stenosis.  Mild left foraminal stenosis.  L5-S1: Disc bulge, facet hypertrophy, mild canal stenosis.  Mild right foraminal stenosis  Flexion and extension x-rays of the lumbar spine on 8/22/2023 revealed diffuse bone demineralization.  Multilevel degenerative changes.  There is no evidence of lumbar instability with flexion or extension.        Review of Systems   Musculoskeletal:  Positive for back pain and myalgias.   All other systems reviewed and are negative.      Patient Active Problem List   Diagnosis    Coronary arteriosclerosis    Diverticulitis    Gastroesophageal reflux disease    Hyperlipidemia    HTN (hypertension)    Multiple nodules of lung    Nicotine dependence    Obesity    Renal disease    S/P colostomy takedown    Type 2 diabetes mellitus without complication, without long-term current use of insulin    Lumbar stenosis with neurogenic claudication    Spondylosis of lumbar region without myelopathy or radiculopathy    Degeneration of lumbar or lumbosacral intervertebral disc    Peripheral vascular disease with claudication    Former smoker    Gait disturbance    Physical deconditioning    Sarcopenia       Past Medical History:   Diagnosis Date    Allergies     Diabetes     Hypercholesterolemia     Hypertension     Low back pain     Lumbar stenosis with neurogenic claudication 9/5/2023    Lumbosacral disc disease     Shingles     Spondylosis of lumbar region without myelopathy or radiculopathy 9/5/2023         Past Surgical History:   Procedure Laterality Date    COLECTOMY PARTIAL / TOTAL           Family History   Problem Relation Age of Onset    Breast cancer Sister 50    Cancer Mother     Heart disease Father     Ovarian cancer Neg Hx          Social  "History     Socioeconomic History    Marital status:    Tobacco Use    Smoking status: Former     Packs/day: 0.50     Years: 44.00     Pack years: 22.00     Types: Cigarettes     Start date: 1978     Quit date: 2022     Years since quittin.1    Smokeless tobacco: Never   Vaping Use    Vaping Use: Never used   Substance and Sexual Activity    Alcohol use: Yes     Comment: rare    Drug use: Never    Sexual activity: Yes     Partners: Male           Current Outpatient Medications:     atorvastatin (LIPITOR) 10 MG tablet, TAKE 1 TABLET BY MOUTH EVERY DAY, Disp: 90 tablet, Rfl: 1    chlorthalidone (HYGROTON) 25 MG tablet, TAKE 1 TABLET BY MOUTH EVERY DAY, Disp: 90 tablet, Rfl: 1    Cholecalciferol 50 MCG (2000 UT) capsule, Take 1 capsule by mouth., Disp: , Rfl:     irbesartan (AVAPRO) 300 MG tablet, Take 1 tablet by mouth Daily., Disp: 90 tablet, Rfl: 1    metFORMIN (GLUCOPHAGE) 500 MG tablet, TAKE 1 TABLET BY MOUTH EVERY DAY WITH BREAKFAST, Disp: 90 tablet, Rfl: 1    montelukast (SINGULAIR) 10 MG tablet, TAKE 1 TABLET BY MOUTH EVERY DAY AT NIGHT, Disp: 90 tablet, Rfl: 1    Potassium 99 MG tablet, Take  by mouth., Disp: , Rfl:     varenicline (CHANTIX) 1 MG tablet, TAKE 1 TABLET BY MOUTH TWICE A DAY, Disp: 60 tablet, Rfl: 1      Allergies   Allergen Reactions    Azithromycin GI Intolerance         /88 (BP Location: Right arm, Patient Position: Sitting, Cuff Size: Adult)   Pulse 88   Temp 97.5 °F (36.4 °C) (Infrared)   Resp 18   Ht 167.6 cm (66\")   Wt 113 kg (249 lb 3.2 oz)   SpO2 98%   BMI 40.22 kg/m²       Physical Exam:  Constitutional: Patient appears well-developed, well-nourished, well-hydrated  HEENT: Head: Normocephalic and atraumatic  Eyes: Conjunctivae and lids are normal  Pupils: Equal, round, reactive to light  Peripheral vascular exam: Femoral: right 2+, left 2+. Posterior tibialis: right 1+ and left 1+. Dorsalis pedis: right 0+ and left 0+. 1+ edema. Presence of varicose " veins.  Musculoskeletal   Gait and station: Gait evaluation demonstrated shuffling. Able to walk with some difficulties on heels, toes, tandem walking   Lumbar Spine: Passive and active range of motion are limited secondary to pain. Extension, lateral flexion, rotation of the lumbar spine increased and reproduced pain. Lumbar facet joint loading maneuvers are positive.  Sacroiliac Joints: Drake's test and Gaenslen's test  are negative   Piriformis maneuvers: Negative   Right Hip Joint: The range of motion of the hip joint is almost full and without pain   Left Hip Joint: The range of motion of the hip joint is almost full and without pain   Palpation of the bilateral psoas tendons and iliopsoas bursas: Unrevealing   Palpation of the bilateral greater trochanters: Unrevealing   Examination of the Iliotibial band: Unrevealing   Neurological:   Patient is alert and oriented to person, place, and time.   Speech: Normal.   Cortical function: Normal mental status.   Reflex Scores:  Right patellar: 1+  Left patellar: 1+  Right Achilles: 0+  Left Achilles: 0+  Motor strength: 5/5  Motor Tone: Normal  Involuntary movements: None.   Superficial/Primitive Reflexes: Primitive reflexes were absent.   Right Senior: Absent  Left Senior: Absent  Right ankle clonus: Absent  Left ankle clonus: Absent   Babinsky: Absent  Long tract signs: Negative. Straight leg raising test: Negative. Femoral stretch sign: Negative.   Sensory exam: Intact to light touch, intact pain and temperature sensation, intact vibration sensation and normal proprioception  Coordination: Finger to nose: Normal. Balance: Normal Romberg's sign: Negative   Skin and subcutaneous tissue: Skin is warm and intact. No rash noted. No cyanosis.   Psychiatric: Judgment and insight: Normal. Recent and remote memory: Intact. Mood and affect: Normal.     ASSESSMENT:   1. Lumbar stenosis with neurogenic claudication    2. Spondylosis of lumbar region without myelopathy or  radiculopathy    3. Degeneration of lumbar or lumbosacral intervertebral disc    4. Sarcopenia    5. Peripheral vascular disease with claudication    6. Type 2 diabetes mellitus without complication, without long-term current use of insulin    7. Former smoker    8. Gait disturbance    9. Physical deconditioning        PLAN/MEDICAL DECISION MAKING:  Ms. Aury Silverio, 63 y.o. female presents with a 10-year history of chronic intractable lower back pain, which began without incident. Aury Silverio reports a several year history of progressively worsening low back and bilateral leg pain radiating into the lower extremities, left worse than right associated with severe claudication which appears to be possibly neurogenic and vascular. MRI lumbar spine without contrast on 08/10/2023 revealed degenerative changes throughout the lumbar spine.  At L4-L5 examination of disc bulge, facet hypertrophy, and ligamentum flavum hypertrophy contributes to moderate canal stenosis and moderate foraminal stenosis.  There is evidence of severe sarcopenia in the lumbar paravertebral muscles.  Flexion-extension x-rays of the lumbar spine reveal appropriate alignment without evidence of instability. There is extensive calcification of the abdominal aorta.  Pain has progressed in intensity over the past year. Aury Silverio has failed to obtain pain relief with conservative measures for more than 12 months including oral analgesics, topical analgesics, ice, heat, physical therapy (completed within the past 6 months), physical therapist directed home exercise program HEP (ongoing), to name a few. A comprehensive evaluation including history and physical exam along with pertinent physiologic and functional assessment was performed. Patient presents with intractable pain due to the diagnoses listed above. Patient has failed to respond to conservative modalities, as referenced under HPI. I have  documented the impact of patient's moderate-to-severe pain contributing to significant impairment in daily activities, ADLs, and a negative impact on the patient's quality of life, as reflected on Global pain scale 40/100; The [place] Back Pain Disability Scale 49/100. I have reviewed pertinent supporting diagnostic studies of patient's chronic pain condition as well as all available pertinent medical records to patient's chronic pain condition including previous therapies, as referenced above.   PHQ-2 Depression Screening 1; Pain Self-Efficacy Questionnaire (PSEQ) 21/60. I had a lengthy conversation with Ms. Aury Silverio regarding her chronic pain condition and potential therapeutic options including risks, benefits, alternative therapies, to name a few. We have discussed using a stepwise approach starting with the least intense level of care as determined by the extent required to diagnose and or treat a patient's condition. The treatments proposed are consistent with the patient's medical condition and are known to be as safe and effective by current guidelines and standard of care. The duration and frequency proposed are considered appropriate for the service in accordance with accepted standards of medical practice for the diagnosis and treatment of the patient's condition and intended to improve the patient's level of function. These services will be furnished in a setting appropriate to the patient's medical needs and condition. Therefore, I have proposed the following plan:    1. Interventional pain management measures: Patient does not take blood thinners. Patient will be scheduled for diagnostic and therapeutic bilateral L4-L5 transforaminal epidural steroid injections using the lowest effective dose of steroids, under C-arm fluoroscopic guidance, with the use of contrast dye (unless contraindicated) to confirm appropriate needle placement and spread of contrast dye. We may repeat  therapeutic bilateral L4-L5 transforaminal epidural steroid injections depending on patient's outcome and following current guidelines, epidurals will be limited to a maximum of 4 sessions per spinal region in a rolling twelve (12) month period. Continuation of epidural steroid injections over 12 months would only be considered under the following provisions;  Patient is a high-risk surgical candidate, or the patient does not desire surgery, or recurrence of pain in the same location relieved with ESIs for at least three months and epidural provides at least 50% sustained improvement of pain and/or 50% objective improvement in function (using same scale as baseline)  Pain is severe enough to cause a significant degree of functional disability or vocational disability  The primary care provider will be notified regarding continuation of procedures and repeat steroid use   Other option for treatment of her lumbar issues would include consideration for MILD, Vertiflex, ReActiv8, PNS, SCS.  As it appears that she may be dealing with spinal and vascular problems, an SCS with appeared to be a good option if she continues to struggle with pain.  Patient will follow-up with neurosurgery thereafter    2. Diagnostic studies:   A. EMG/NCV of the bilateral lower extremities   B. Arterial duplex Doppler of the lower extremities with ABIs  C. Prior to MILD, SCS trial: CBC, PT, PTT, and MRI of the thoracic spine w/o constrast to assess spinal canal and epidural space     3. Pharmacological measures: Reviewed and discussed;   A. Trial with Rheumate one tablet once daily  B.  Start pyridoxine 100 mg one tablet by mouth daily take for 30 days, #30, no refills  C. Start alpha lipoid acid 3195-3410 mg per day divided into 3 doses  D. Trial with prilocaine 2%, lidocaine 10%, imipramine 3%, capsaicin 0.001%, and mannitol 20% cream, apply 1 to 2 grams of cream to the affected areas every 4 to 6 hours as needed  E. Patient declined a trial  with conventional analgesics (duloxetine, venlafaxine, gabapentin, etc)    4. Long-term rehabilitation efforts:  A. The patient does not have a history of falls. Also, I performed a risk assessment for falls using the Tinetti gait & balance assessment tool (scored 24/28; low risk for falls).   B. Patient will start a comprehensive physical therapy program for Alter-G, gait and balance training, neurodynamics, core strengthening, myofascial release, cupping, dry needling, home exercise program, 2-3 x per week for 8 weeks  C. Start a low impact exercise program such as water therapy, swimming  D. Contrast therapy: Apply ice-packs for 15-20 minutes, followed by heating pads for 15-20 minutes to affected area   E. Prior to SCS: Patient will need a referral to Dr. Flavio Allen for psychological screening for spinal cord stimulation and intrathecal therapies.  F. Referral to [facility]. Patient's Body mass index is 40.22 kg/m². Patient counseled on the importance of weight loss to help with overall health and pain control.   NOAH Silverio  reports that she quit smoking about 14 months ago. Her smoking use included cigarettes. She started smoking about 45 years ago. She has a 22.00 pack-year smoking history. She has never used smokeless tobacco. She takes Chantix     5. Referrals: Referral to Dr. Snow in consultation for PAD.     6. The patient has been instructed to contact my office with any questions or difficulties. The patient understands the plan and agrees to proceed accordingly.    The patient has a documented plan of care to address chronic pain. Aury Silverio reports a pain score of 4/10.  Given her pain assessment as noted, treatment options were discussed and the following options were decided upon as a follow-up plan to address the patient's pain: continuation of current treatment plan for pain, educational materials on pain management, home  exercises and therapy, patient declined analgesics, patient not interested in pharmacological measures, prescription for non-opiod analgesics, prescription for opiod analgesics, referral to Physical Therapy, referral to specialist for assistance in pain treatment guidance, steroid injections, use of non-medical modalities (ice, heat, stretching and/or behavior modifications), and interventional pain management measures including neuromodulation techniques .      Pain being managed through medication therapy.     Pain Management Panel           No data to display                 SANDY query complete. SANDY reviewed by Neo Vazquez MD.        No orders of the defined types were placed in this encounter.     Please note that portions of this note were completed with a voice recognition program.   Any copied data in any portion of my note has been reviewed by myself and accurate.     The 21st Century Cures Act makes medical notes like this available to patients in the interest of transparency. This is a medical document intended as peer to peer communication. It is written in medical language and may contain abbreviations or verbiage that are unfamiliar. It may appear blunt or direct. Medical documents are intended to carry relevant information, facts as evident, and the clinical opinion of the practitioner.     Neo Vazquez MD    Patient Care Team:  Silvia Schulz APRN as PCP - General (Nurse Practitioner)  Neo Vazquez MD as Consulting Physician (Pain Medicine)     No orders of the defined types were placed in this encounter.        No future appointments.   \

## 2023-09-05 ENCOUNTER — OFFICE VISIT (OUTPATIENT)
Dept: PAIN MEDICINE | Facility: CLINIC | Age: 63
End: 2023-09-05
Payer: MEDICARE

## 2023-09-05 VITALS
RESPIRATION RATE: 18 BRPM | OXYGEN SATURATION: 98 % | WEIGHT: 249.2 LBS | DIASTOLIC BLOOD PRESSURE: 88 MMHG | SYSTOLIC BLOOD PRESSURE: 134 MMHG | HEART RATE: 88 BPM | BODY MASS INDEX: 40.05 KG/M2 | HEIGHT: 66 IN | TEMPERATURE: 97.5 F

## 2023-09-05 DIAGNOSIS — M62.84 SARCOPENIA: ICD-10-CM

## 2023-09-05 DIAGNOSIS — I73.9 PERIPHERAL VASCULAR DISEASE WITH CLAUDICATION: ICD-10-CM

## 2023-09-05 DIAGNOSIS — R26.9 GAIT DISTURBANCE: ICD-10-CM

## 2023-09-05 DIAGNOSIS — M48.062 LUMBAR STENOSIS WITH NEUROGENIC CLAUDICATION: ICD-10-CM

## 2023-09-05 DIAGNOSIS — M48.062 LUMBAR STENOSIS WITH NEUROGENIC CLAUDICATION: Primary | ICD-10-CM

## 2023-09-05 DIAGNOSIS — Z87.891 FORMER SMOKER: ICD-10-CM

## 2023-09-05 DIAGNOSIS — R53.81 PHYSICAL DECONDITIONING: ICD-10-CM

## 2023-09-05 DIAGNOSIS — E11.9 TYPE 2 DIABETES MELLITUS WITHOUT COMPLICATION, WITHOUT LONG-TERM CURRENT USE OF INSULIN: ICD-10-CM

## 2023-09-05 DIAGNOSIS — M47.816 SPONDYLOSIS OF LUMBAR REGION WITHOUT MYELOPATHY OR RADICULOPATHY: ICD-10-CM

## 2023-09-05 DIAGNOSIS — M51.37 DEGENERATION OF LUMBAR OR LUMBOSACRAL INTERVERTEBRAL DISC: ICD-10-CM

## 2023-09-05 DIAGNOSIS — I73.9 PAD (PERIPHERAL ARTERY DISEASE): ICD-10-CM

## 2023-09-05 PROBLEM — M51.379 DEGENERATION OF LUMBAR OR LUMBOSACRAL INTERVERTEBRAL DISC: Status: ACTIVE | Noted: 2023-09-05

## 2023-09-05 PROCEDURE — 1160F RVW MEDS BY RX/DR IN RCRD: CPT | Performed by: ANESTHESIOLOGY

## 2023-09-05 PROCEDURE — 1125F AMNT PAIN NOTED PAIN PRSNT: CPT | Performed by: ANESTHESIOLOGY

## 2023-09-05 PROCEDURE — 3079F DIAST BP 80-89 MM HG: CPT | Performed by: ANESTHESIOLOGY

## 2023-09-05 PROCEDURE — 1159F MED LIST DOCD IN RCRD: CPT | Performed by: ANESTHESIOLOGY

## 2023-09-05 PROCEDURE — 99204 OFFICE O/P NEW MOD 45 MIN: CPT | Performed by: ANESTHESIOLOGY

## 2023-09-05 PROCEDURE — 3075F SYST BP GE 130 - 139MM HG: CPT | Performed by: ANESTHESIOLOGY

## 2023-09-05 RX ORDER — MULTIVITAMIN WITH IRON
100 TABLET ORAL DAILY
Qty: 30 TABLET | Refills: 0 | Status: SHIPPED | OUTPATIENT
Start: 2023-09-05

## 2023-09-05 RX ORDER — ME-TETRAHYDROFOLATE/B12/HRB236 1-1-500 MG
1 CAPSULE ORAL DAILY
Qty: 90 CAPSULE | Refills: 0 | Status: SHIPPED | OUTPATIENT
Start: 2023-09-05

## 2023-09-05 RX ORDER — ST. JOHN'S WORT 300 MG
400 CAPSULE ORAL 3 TIMES DAILY
Qty: 180 CAPSULE | Refills: 1 | Status: SHIPPED | OUTPATIENT
Start: 2023-09-05

## 2023-09-06 ENCOUNTER — TELEPHONE (OUTPATIENT)
Dept: PAIN MEDICINE | Facility: CLINIC | Age: 63
End: 2023-09-06
Payer: MEDICARE

## 2023-09-06 NOTE — TELEPHONE ENCOUNTER
----- Message from Aury Silverio sent at 9/5/2023  2:29 PM EDT -----  Regarding: Questions concerning 9-5-23 visit  Contact: [phone]   stated that a nurse would come into room to explain some tests.  I waited 25 minutes, no one came in.  Now I see 3 test/procedures, would like for some one to explain to me what they are and where they are done, etc   never mentioned any RX's.  Now I see there is 4 RX.  1 mail order/2 at my Pharmacy and 1 on [facility] in [place]-can this be sent to my pharmacy?    Thank you   Patsy Silverio  [phone]

## 2023-09-07 DIAGNOSIS — M48.062 LUMBAR STENOSIS WITH NEUROGENIC CLAUDICATION: ICD-10-CM

## 2023-09-18 ENCOUNTER — OUTSIDE FACILITY SERVICE (OUTPATIENT)
Dept: PAIN MEDICINE | Facility: CLINIC | Age: 63
End: 2023-09-18
Payer: MEDICARE

## 2023-09-18 PROCEDURE — 64483 NJX AA&/STRD TFRM EPI L/S 1: CPT | Performed by: ANESTHESIOLOGY

## 2023-09-18 PROCEDURE — 99152 MOD SED SAME PHYS/QHP 5/>YRS: CPT | Performed by: ANESTHESIOLOGY

## 2023-10-02 DIAGNOSIS — M48.062 LUMBAR STENOSIS WITH NEUROGENIC CLAUDICATION: ICD-10-CM

## 2023-10-03 RX ORDER — DIAPER,BRIEF,ADULT, DISPOSABLE
EACH MISCELLANEOUS
Qty: 30 TABLET | Refills: 0 | OUTPATIENT
Start: 2023-10-03

## 2023-10-24 ENCOUNTER — HOSPITAL ENCOUNTER (OUTPATIENT)
Dept: CARDIOLOGY | Facility: HOSPITAL | Age: 63
Discharge: HOME | End: 2023-10-24
Admitting: ANESTHESIOLOGY
Payer: MEDICARE

## 2023-10-24 VITALS — WEIGHT: 249 LBS | BODY MASS INDEX: 39.08 KG/M2 | HEIGHT: 67 IN

## 2023-10-24 DIAGNOSIS — I73.9 PAD (PERIPHERAL ARTERY DISEASE): ICD-10-CM

## 2023-10-24 LAB
BH CV GRAFT BRACHIAL PRESSURE LEFT: 156 MMHG
BH CV GRAFT BRACHIAL PRESSURE RIGHT: 165 MMHG
BH CV LEA LEFT ANT TIBIAL A DISTAL EDV: 11 CM/S
BH CV LEA LEFT ANT TIBIAL A DISTAL PSV: 78.3 CM/S
BH CV LEA LEFT CFA DISTAL EDV: 17.7 CM/S
BH CV LEA LEFT CFA DISTAL PSV: 183 CM/S
BH CV LEA LEFT DFA PROX EDV: 15.4 CM/S
BH CV LEA LEFT DFA PROX PSV: 122 CM/S
BH CV LEA LEFT DPA PRESSURE: 108 MMHG
BH CV LEA LEFT PERONEAL  MID PSV: 54.4 CM/S
BH CV LEA LEFT POPITEAL A  DISTAL EDV: -9.6 CM/S
BH CV LEA LEFT POPITEAL A  DISTAL PSV: -81.2 CM/S
BH CV LEA LEFT POPITEAL A  PROX EDV: 7 CM/S
BH CV LEA LEFT POPITEAL A  PROX PSV: 64.2 CM/S
BH CV LEA LEFT PTA DISTAL EDV: 9.8 CM/S
BH CV LEA LEFT PTA DISTAL PSV: 91.8 CM/S
BH CV LEA LEFT PTA PRESSURE: 110 MMHG
BH CV LEA LEFT SFA DISTAL EDV: -13.1 CM/S
BH CV LEA LEFT SFA DISTAL PSV: -151 CM/S
BH CV LEA LEFT SFA MID EDV: -11.9 CM/S
BH CV LEA LEFT SFA MID PSV: -94.3 CM/S
BH CV LEA LEFT SFA PROX EDV: 12.6 CM/S
BH CV LEA LEFT SFA PROX PSV: 152 CM/S
BH CV LEA RIGHT ANT TIBIAL A DISTAL EDV: 13.2 CM/S
BH CV LEA RIGHT ANT TIBIAL A DISTAL PSV: 92.4 CM/S
BH CV LEA RIGHT CFA DISTAL EDV: 23.9 CM/S
BH CV LEA RIGHT CFA DISTAL PSV: 255 CM/S
BH CV LEA RIGHT DFA PROX EDV: 31.4 CM/S
BH CV LEA RIGHT DFA PROX PSV: 324 CM/S
BH CV LEA RIGHT DPA PRESSURE: 110 MMHG
BH CV LEA RIGHT PERONEAL  MID PSV: 49.4 CM/S
BH CV LEA RIGHT POPITEAL A  DISTAL EDV: -6.3 CM/S
BH CV LEA RIGHT POPITEAL A  DISTAL PSV: -60.4 CM/S
BH CV LEA RIGHT POPITEAL A  PROX PSV: 52 CM/S
BH CV LEA RIGHT PTA DISTAL EDV: 10.8 CM/S
BH CV LEA RIGHT PTA DISTAL PSV: 89.4 CM/S
BH CV LEA RIGHT PTA PRESSURE: 114 MMHG
BH CV LEA RIGHT SFA DISTAL EDV: -13.3 CM/S
BH CV LEA RIGHT SFA DISTAL PSV: -75.4 CM/S
BH CV LEA RIGHT SFA MID EDV: -9.8 CM/S
BH CV LEA RIGHT SFA MID PSV: -112 CM/S
BH CV LEA RIGHT SFA PROX EDV: 14 CM/S
BH CV LEA RIGHT SFA PROX PSV: 134 CM/S
BH CV LOWER ARTERIAL LEFT ABI RATIO: 0.71
BH CV LOWER ARTERIAL RIGHT ABI RATIO: 0.69
BH CV LOWER ARTERIAL RIGHT HIGHEST VELOCITY RATIO: 2.1
BH CV LOWER ARTERIAL RIGHT VELOCITY RATIO LOCATION: NORMAL
LEFT GROIN CFA SYS: 183 CM/SEC
RIGHT GROIN CFA SYS: 255 CM/SEC

## 2023-10-24 PROCEDURE — 93925 LOWER EXTREMITY STUDY: CPT

## 2023-10-26 ENCOUNTER — PATIENT MESSAGE (OUTPATIENT)
Dept: FAMILY MEDICINE CLINIC | Facility: CLINIC | Age: 63
End: 2023-10-26
Payer: MEDICARE

## 2023-10-30 DIAGNOSIS — I73.9 PAD (PERIPHERAL ARTERY DISEASE): Primary | ICD-10-CM

## 2023-10-31 ENCOUNTER — TELEPHONE (OUTPATIENT)
Dept: FAMILY MEDICINE CLINIC | Facility: CLINIC | Age: 63
End: 2023-10-31
Payer: MEDICARE

## 2023-10-31 DIAGNOSIS — I10 PRIMARY HYPERTENSION: ICD-10-CM

## 2023-10-31 RX ORDER — IRBESARTAN 300 MG/1
300 TABLET ORAL DAILY
Qty: 90 TABLET | Refills: 1 | Status: SHIPPED | OUTPATIENT
Start: 2023-10-31 | End: 2023-11-01 | Stop reason: SDUPTHER

## 2023-11-01 DIAGNOSIS — I10 PRIMARY HYPERTENSION: ICD-10-CM

## 2023-11-01 RX ORDER — IRBESARTAN 300 MG/1
300 TABLET ORAL DAILY
Qty: 90 TABLET | Refills: 1 | Status: SHIPPED | OUTPATIENT
Start: 2023-11-01

## 2023-11-07 NOTE — PROGRESS NOTES
[facility]  [address], [address]  [place], [place], [address]    [phone]  [email or web address].[email or web address]           OUTPATIENT CLINIC CONSULTATION NOTE    Patient care team:  Patient Care Team:  Silvia Schulz APRN as PCP - General (Nurse Practitioner)  Neo Vazquez MD as Consulting Physician (Pain Medicine)    Requesting Provider and Reason for consultation: The patient is being seen today at the request of Neo Vazquez MD for PAD.     Subjective:   Chief complaint:   Chief Complaint   Patient presents with    Establish Care     PAD         Aury Silverio is a 63 y.o. female.  Cardiac focused problem list:  PAD  Arterial duplex, 10/24/2023:  Right WYATT 0.7 with multiphasic waveforms throughout. No clear focal obstructive atherosclerotic lesions noted.  Left YWATT 0.7 with multiphasic waveforms throughout. No clear focal obstructive atherosclerotic lesions noted.   Abnormal ST segment in the inferolateral leads  Dates back to at least 2014  Mild nonobstructive CAD  [facility] Dr. [facility], 2014  Hypertension   Hyperlipidemia   Type 2 diabetes mellitus   GERD  Diverticulitis   CKD   Former tobacco dependence, quit 2022    HPI:  Patient presents today in consultation for PAD.       For the last 3 years the patient has had lower back pain with shooting pains down both hips.  Also with suspected vascular claudication, with both thigh and calf discomfort when walking a couple 100 feet.  Left greater than right.  Seen by Dr. Vazquez in the pain clinic, has undergone imaging and epidural injections with partial symptom improvement.  Arterial duplex performed and found to be abnormal.  Long smoking history.  Family history of father with MI at 51.    Review of Systems:  As noted above in the HPI    PFSH:  Patient Active Problem List   Diagnosis    Coronary arteriosclerosis    Diverticulitis    Gastroesophageal reflux disease    Hyperlipidemia    HTN (hypertension)     Multiple nodules of lung    Nicotine dependence    Obesity    Renal disease    S/P colostomy takedown    Type 2 diabetes mellitus without complication, without long-term current use of insulin    Lumbar stenosis with neurogenic claudication    Spondylosis of lumbar region without myelopathy or radiculopathy    Degeneration of lumbar or lumbosacral intervertebral disc    Peripheral vascular disease with claudication    Former smoker    Gait disturbance    Physical deconditioning    Sarcopenia         Current Outpatient Medications:     Alpha Lipoic Acid 200 MG capsule, Take 400 mg by mouth 3 (Three) Times a Day., Disp: 180 capsule, Rfl: 1    atorvastatin (LIPITOR) 10 MG tablet, TAKE 1 TABLET BY MOUTH EVERY DAY, Disp: 90 tablet, Rfl: 1    chlorthalidone (HYGROTON) 25 MG tablet, TAKE 1 TABLET BY MOUTH EVERY DAY, Disp: 90 tablet, Rfl: 1    Cholecalciferol 50 MCG (2000 UT) capsule, Take 1 capsule by mouth., Disp: , Rfl:     Dietary Management Product (Rheumate) capsule, Take 1 capsule by mouth Daily., Disp: 90 capsule, Rfl: 0    irbesartan (AVAPRO) 300 MG tablet, Take 1 tablet by mouth Daily., Disp: 90 tablet, Rfl: 1    metFORMIN (GLUCOPHAGE) 500 MG tablet, TAKE 1 TABLET BY MOUTH EVERY DAY WITH BREAKFAST, Disp: 90 tablet, Rfl: 1    montelukast (SINGULAIR) 10 MG tablet, TAKE 1 TABLET BY MOUTH EVERY DAY AT NIGHT, Disp: 90 tablet, Rfl: 1    Potassium 99 MG tablet, Take  by mouth., Disp: , Rfl:     varenicline (CHANTIX) 1 MG tablet, TAKE 1 TABLET BY MOUTH TWICE A DAY, Disp: 60 tablet, Rfl: 1    Allergies   Allergen Reactions    Azithromycin GI Intolerance       Social History     Socioeconomic History    Marital status:    Tobacco Use    Smoking status: Former     Packs/day: 0.50     Years: 44.00     Additional pack years: 0.00     Total pack years: 22.00     Types: Cigarettes     Start date: 1978     Quit date: 2022     Years since quittin.3     Passive exposure: Past    Smokeless tobacco: Never  "  Vaping Use    Vaping Use: Former   Substance and Sexual Activity    Alcohol use: Yes     Comment: rare    Drug use: Never    Sexual activity: Yes     Partners: Male     Family History   Problem Relation Age of Onset    Breast cancer Sister 50    Cancer Mother     Heart disease Father     Ovarian cancer Neg Hx          Objective:   Physical Exam:  /82 (BP Location: Left arm, Patient Position: Sitting)   Pulse 80   Ht 167.6 cm (66\")   Wt 112 kg (248 lb)   SpO2 97%   BMI 40.03 kg/m²       Labs:  Labs reviewed by myself    No results found for: \"CHOL\"  Lab Results   Component Value Date    TRIG 90 10/26/2022     Lab Results   Component Value Date    HDL 42 10/26/2022     Lab Results   Component Value Date    LDL 70 10/26/2022     No components found for: \"LDLDIRECTC\"    Diagnostic Data:      ECG 12 Lead    Date/Time: 11/13/2023 10:13 AM  Performed by: Eligio Snow MD    Authorized by: Eligio Snow MD  Comparison: compared with previous ECG from 9/29/2014  Similar to previous ECG  Comparison to previous ECG: Incomplete right bundle, nonspecific ST abnormality in the inferolateral leads  Rhythm: sinus rhythm              Assessment and Plan:     Peripheral arterial disease  Claudication  -Abnormal arterial duplex with mildly to moderately decreased ABIs  -Recommend CT abdominal aorta with iliofemoral runoff  -Recommend continued risk factor and lifestyle modification  -If with obstructive PAD on CTA, consider trial cilostazol versus invasive evaluation/management.  Discussed possible peripheral angiogram with patient today    Abnormal EKG  Mild nonobstructive CAD  Mixed hyperlipidemia  -Continue current medications and lifestyle/risk factor modification  -Clinical monitoring for cardiac symptoms    - Return in about 6 months (around 5/13/2024) for Next scheduled follow up, or sooner if needed.      "

## 2023-11-13 ENCOUNTER — OFFICE VISIT (OUTPATIENT)
Dept: CARDIOLOGY | Facility: CLINIC | Age: 63
End: 2023-11-13
Payer: MEDICARE

## 2023-11-13 ENCOUNTER — PATIENT ROUNDING (BHMG ONLY) (OUTPATIENT)
Dept: CARDIOLOGY | Facility: CLINIC | Age: 63
End: 2023-11-13

## 2023-11-13 VITALS
HEIGHT: 66 IN | BODY MASS INDEX: 39.86 KG/M2 | DIASTOLIC BLOOD PRESSURE: 82 MMHG | OXYGEN SATURATION: 97 % | WEIGHT: 248 LBS | HEART RATE: 80 BPM | SYSTOLIC BLOOD PRESSURE: 140 MMHG

## 2023-11-13 DIAGNOSIS — I70.213 INTERMITTENT CLAUDICATION OF BOTH LOWER EXTREMITIES DUE TO ATHEROSCLEROSIS: ICD-10-CM

## 2023-11-13 DIAGNOSIS — I73.9 PAD (PERIPHERAL ARTERY DISEASE): Primary | ICD-10-CM

## 2023-11-13 DIAGNOSIS — E78.2 MIXED HYPERLIPIDEMIA: ICD-10-CM

## 2023-11-13 PROCEDURE — 3077F SYST BP >= 140 MM HG: CPT | Performed by: INTERNAL MEDICINE

## 2023-11-13 PROCEDURE — 3079F DIAST BP 80-89 MM HG: CPT | Performed by: INTERNAL MEDICINE

## 2023-11-13 PROCEDURE — 93000 ELECTROCARDIOGRAM COMPLETE: CPT | Performed by: INTERNAL MEDICINE

## 2023-11-13 PROCEDURE — 99204 OFFICE O/P NEW MOD 45 MIN: CPT | Performed by: INTERNAL MEDICINE

## 2023-11-13 NOTE — PROGRESS NOTES
November 13, 2023    Hello, may I speak with Aury Silverio?  Yes.    My name is Luz Maria LUCAS      I am  with [facility]  [address]  [facility] [id number] [phone].    Before we get started may I verify your date of birth? 1960, Yes, correct.    I am calling to officially welcome you to our practice and ask about your recent visit. Is this a good time to talk? Yes.    Tell me about your visit with us. What things went well?  All of it.  Doctor explained everything very well for me.  All efficient in this office.       We're always looking for ways to make our patients' experiences even better. Do you have recommendations on ways we may improve?  No.    Overall were you satisfied with your first visit to our practice? Yes.       I appreciate you taking the time to speak with me today. Is there anything else I can do for you? No.      Thank you, and have a great day.

## 2023-12-07 ENCOUNTER — HOSPITAL ENCOUNTER (OUTPATIENT)
Dept: NEUROLOGY | Facility: HOSPITAL | Age: 63
Discharge: HOME | End: 2023-12-07
Admitting: ANESTHESIOLOGY
Payer: COMMERCIAL

## 2023-12-07 DIAGNOSIS — M48.062 LUMBAR STENOSIS WITH NEUROGENIC CLAUDICATION: ICD-10-CM

## 2023-12-07 DIAGNOSIS — M47.816 SPONDYLOSIS OF LUMBAR REGION WITHOUT MYELOPATHY OR RADICULOPATHY: ICD-10-CM

## 2023-12-07 PROCEDURE — 95909 NRV CNDJ TST 5-6 STUDIES: CPT

## 2023-12-07 PROCEDURE — 95886 MUSC TEST DONE W/N TEST COMP: CPT

## 2023-12-12 ENCOUNTER — HOSPITAL ENCOUNTER (OUTPATIENT)
Dept: CT IMAGING | Facility: HOSPITAL | Age: 63
Discharge: HOME | End: 2023-12-12
Admitting: INTERNAL MEDICINE
Payer: MEDICARE

## 2023-12-12 DIAGNOSIS — I73.9 PAD (PERIPHERAL ARTERY DISEASE): ICD-10-CM

## 2023-12-12 DIAGNOSIS — I70.213 INTERMITTENT CLAUDICATION OF BOTH LOWER EXTREMITIES DUE TO ATHEROSCLEROSIS: ICD-10-CM

## 2023-12-12 LAB — CREAT BLDA-MCNC: 1.1 MG/DL (ref 0.6–1.3)

## 2023-12-12 PROCEDURE — 25510000001 IOPAMIDOL PER 1 ML: Performed by: INTERNAL MEDICINE

## 2023-12-12 PROCEDURE — 75635 CT ANGIO ABDOMINAL ARTERIES: CPT

## 2023-12-12 PROCEDURE — 82565 ASSAY OF CREATININE: CPT

## 2023-12-12 RX ADMIN — IOPAMIDOL 125 ML: 755 INJECTION, SOLUTION INTRAVENOUS at 15:03

## 2023-12-13 PROBLEM — E11.42 DIABETIC POLYNEUROPATHY ASSOCIATED WITH TYPE 2 DIABETES MELLITUS: Status: ACTIVE | Noted: 2023-12-13

## 2023-12-14 ENCOUNTER — TELEPHONE (OUTPATIENT)
Dept: CARDIOLOGY | Facility: CLINIC | Age: 63
End: 2023-12-14
Payer: MEDICARE

## 2023-12-14 ENCOUNTER — OFFICE VISIT (OUTPATIENT)
Dept: PAIN MEDICINE | Facility: CLINIC | Age: 63
End: 2023-12-14
Payer: COMMERCIAL

## 2023-12-14 VITALS — WEIGHT: 248.6 LBS | BODY MASS INDEX: 39.95 KG/M2 | HEIGHT: 66 IN

## 2023-12-14 DIAGNOSIS — Z87.891 FORMER SMOKER: ICD-10-CM

## 2023-12-14 DIAGNOSIS — M47.816 SPONDYLOSIS OF LUMBAR REGION WITHOUT MYELOPATHY OR RADICULOPATHY: ICD-10-CM

## 2023-12-14 DIAGNOSIS — M62.84 SARCOPENIA: ICD-10-CM

## 2023-12-14 DIAGNOSIS — E11.42 DIABETIC POLYNEUROPATHY ASSOCIATED WITH TYPE 2 DIABETES MELLITUS: ICD-10-CM

## 2023-12-14 DIAGNOSIS — M48.062 LUMBAR STENOSIS WITH NEUROGENIC CLAUDICATION: ICD-10-CM

## 2023-12-14 DIAGNOSIS — R53.81 PHYSICAL DECONDITIONING: ICD-10-CM

## 2023-12-14 DIAGNOSIS — I73.9 PERIPHERAL VASCULAR DISEASE WITH CLAUDICATION: ICD-10-CM

## 2023-12-14 DIAGNOSIS — M24.28 LIGAMENTUM FLAVUM HYPERTROPHY: ICD-10-CM

## 2023-12-14 DIAGNOSIS — E78.2 MIXED HYPERLIPIDEMIA: ICD-10-CM

## 2023-12-14 DIAGNOSIS — R26.9 GAIT DISTURBANCE: ICD-10-CM

## 2023-12-14 DIAGNOSIS — E11.9 TYPE 2 DIABETES MELLITUS WITHOUT COMPLICATION, WITHOUT LONG-TERM CURRENT USE OF INSULIN: ICD-10-CM

## 2023-12-14 DIAGNOSIS — M51.37 DEGENERATION OF LUMBAR OR LUMBOSACRAL INTERVERTEBRAL DISC: ICD-10-CM

## 2023-12-14 RX ORDER — ATORVASTATIN CALCIUM 20 MG/1
20 TABLET, FILM COATED ORAL DAILY
Qty: 90 TABLET | Refills: 3 | Status: SHIPPED | OUTPATIENT
Start: 2023-12-14

## 2023-12-14 RX ORDER — CILOSTAZOL 50 MG/1
50 TABLET ORAL 2 TIMES DAILY
Qty: 180 TABLET | Refills: 1 | Status: SHIPPED | OUTPATIENT
Start: 2023-12-14

## 2023-12-14 NOTE — PROGRESS NOTES
"Chief Complaint: \"I did well after my procedure for one month. Lower back pain and pain in my legs.\"        History of Present Illness:  Ms. Aury Silverio, 63 y.o. female originally referred by Wilian Mazariegos PA-C in consultation for chronic intractable lower back pain.   Pain History: Aury Silverio presented with a 10-year history of progressively worsening lower back and bilateral lower extremity pain, left worse than right, which began without incident. MRI of the lumbar spine without contrast on 06/16/2023 revealed multilevel spondylosis. At L4-L5, disc bulge, facet hypertrophy, ligamentum flavum hypertrophy contributing to moderate spinal canal stenosis and lateral recess stenosis. Mild left foraminal stenosis. At L5-S1: Disc bulge, facet hypertrophy. Mild canal stenosis. Mild right foraminal stenosis. Flexion and extension x-rays of the lumbar spine on 08/22/2023 revealed diffuse bone demineralization. Multilevel degenerative changes. There is no evidence of lumbar instability with flexion or extension. Aury Silverio has failed to obtain pain relief with conservative measures for more than 12 months including oral analgesics, topical analgesics, ice, heat, physical therapy, physical therapist directed home exercise program HEP, to name a few. Pain progressed in intensity over the past year. Aury Silverio underwent on 09/18/2023 diagnostic and therapeutic bilateral L4-L5 transforaminal epidural steroid injections, from which she has experienced 80% pain relief and functional improvement that lasted for 4 weeks. Since her initial visit, she completed the following diagnostic studies:  EMG/NCV on 12/07/2023 revealed moderate axonal sensorimotor neuropathy. Chronic left L5 radiculopathy  Arterial duplex doppler of the lower extremities on 10/24/2023 revealed vrrmtr-bj-[id number] decreased ABIs in both lower extremities. She underwent cardiology consultation " with Dr. Snow on 11/13/2023, who recommended  CT abdominal aorta with iliofemoral runoff due to evidence of PAD on arterial duplex doppler/decreased ABIs. CT angiogram of the abdominal aorta with iliofemoral runoff on 12/12/2023 revealed diffuse atherosclerotic plaque in the aorta common and external iliac arteries bilaterally. The degree of narrowing appears less than 50% without hemodynamically significant stenosis. No hemodynamically significant femoral popliteal or trifurcation level disease in either lower extremity.  Pain Description: Constant lower back pain with intermittent exacerbation, described as aching, dull, sharp and throbbing sensation.   Radiation of Pain: The pain radiates into the gluteal region, anterior thighs, calves LT>RT  Pain intensity today: 5/10   Average pain intensity last week: 4/10  Pain intensity ranges from: 3/10 to 6/10  Aggravating factors: Pain increases with lifting, pushing, pulling, movement, bending forward arching the back, twisting, protracted sitting, standing, walking. Patient describes neurogenic claudication.  Patient does not use a cane or walker  Alleviating factors: Pain decreases with sitting down, lying down on a recliner  Associated Symptoms:   Patient reports pain, numbness, and weakness in the lower extremities.   Patient denies any new bladder or bowel problems.   Patient reports difficulties with her balance but denies recent falls.   Pain interferes with general activities (ability to walk, stand, transition from different positions), and affects patient's quality of life  Pain interferes with sleep falling asleep and causing sleep fragmentation   Muscle spasms: LB  Stiffness: LB    Review of previous therapies and additional medical records:  Aury Silverio has already failed the following measures, including:   Conservative Measures: Oral analgesics, topical analgesics, ice, heat, physical therapy (completed within the past 6 months), physical  therapist directed home exercise program HEP (ongoing)  Interventional Measures: 09/18/2023: Diagnostic and therapeutic bilateral L4-L5 transforaminal epidural steroid injections  Surgical Measures: No history of previous cervical spine, lumbar spine or hip surgery   Aury Silverio underwent neurosurgical consultation with Wilian Mazariegos PA-C on 08/15/2023, and was found not to be a surgical candidate.  Aury Silverio underwent cardiology consultation with Dr. Eligio Snow on 11/13/2023, who confirmed the diagnosis of PAD: Abnormal arterial duplex with mildly to moderately decreased ABIs. He recommended CT abdominal aorta with iliofemoral runoff, which she completed on 12/12/2023.  Aury Silverio presents with significant comorbidities including HTN, Type 2 DBTs,  HLP, GERD, CAD, former smoker  In terms of current analgesics, Aury Silverio takes:  Tylenol. Patient is on Chantix  I have reviewed Arun Report consistent with medication reconciliation.  SOAPP/ORT: Low Risk     PHQ-2 Depression Screening  Little interest or pleasure in doing things? 0-->not at all   Feeling down, depressed, or hopeless? 0-->not at all   PHQ-2 Total Score 0     Pain Self-Efficacy Questionnaire (PSEQ)  ITEM 09-05 2023 12-14 2023       I can enjoy things despite the pain. 2 5       I can do most of the household chores (tidying up, washing dishes, etc), despite the pain. 3 5       I can socialize with my friends or family members as often as I used to do, despite the pain. 3 5       I can cope with my pain in most situations. 2 5       I can do some form of work, despite the pain (includes housework, paid, and unpaid work). 2 4       I can still do many of the things I enjoy doing, such as hobbies or leisure activity despite pain. 2 4       I can cope with my pain without medications. 2 3       I can accomplish most of my goals in life despite the pain. 2 4       I can live in a  normal lifestyle, despite the pain. 2 5       I can gradually become more active, despite the pain. 1 3       TOTAL SCORE 21/60 43/60           Global Pain Scale 09-05 2023 12-14 2023         Pain 15 12         Feelings 3 0         Clinical outcomes 11 1         Activities 11 0         GPS Total: 40 13           The [place] Back Pain Disability Scale   DATE 09-05 2023 12-14 2023         Sleep through the night 3 3         Turn over in bed 2 3         Get out of bed 2 2         Make your bed 1 2         Put on socks (pantyhose) 3 3         Ride in a car 2 2         Sit in a chair for several hours 2 2         Stand up for 20-30 minutes 3 2         Climb one flight of stairs 3 3         Walk a few blocks (200-300 yards)  3 2         Walk several miles 4 4         Run one block (about 50 yards) 5 5         Take food out of the refrigerator 1 1         Reach up to high shelves 2 2         Move a chair 2 2         Pull or push heavy doors 2 3         Bend over to clean the bathtub 3 4         Throw a ball 2 3         Carry two bags of groceries 2 3         Lift and carry a heavy suitcase 3 4         Total score 49 55           Review of New Diagnostic Studies:   I have independently reviewed and interpreted the images with the patient and used the images and a tridimensional spine model to explain findings. I have also reviewed the reports.  EMG/NCV 12/07/2023: mild-moderate axonal sensorimotor neuropathy. Normal EMG of right leg and back. Chronic left L5 radiculopathy  Arterial duplex doppler of the lower extremities 10/24/2023  Right lower extremity: Ofihsr-gm-tmllirsxcc decreased WYATT of 0.7.  Multiphasic waveforms throughout with Doppler flow noted to the dorsalis pedis and distal PT arteries.  No clear focal obstructive atherosclerotic lesions noted.  Left lower extremity: Pmebsr-rt-sxxauwadlo decreased WYATT of 0.7. Multiphasic waveforms throughout with Doppler flow noted to the dorsalis pedis and distal PT  arteries. No clear focal obstructive atherosclerotic lesions noted.  CT angiogram of the abdominal aorta with iliofemoral runoff on 12/12/2023: The abdominal aorta is of normal caliber. The SMA and celiac axis are patent without significant stenosis. The renal arteries appear patent without significant narrowing. There are small accessory renal arteries bilaterally. Dense atherosclerotic plaque is present in the distal abdominal aorta extending into the right and left iliac arteries. There is extensive calcified plaque in the external iliac arteries bilaterally. The degree of iliac narrowing appears less than 50% on both the right and left. There is atherosclerotic plaque in the superficial femoral artery on the right. Popliteal artery appears patent without significant narrowing. There is three-vessel distal runoff. On the left there is atherosclerotic plaque in the left common femoral artery and calcified plaque in the left superficial femoral artery. There is calcified plaque in the popliteal artery but there is no flow-limiting stenosis. There is calcified plaque in the tibial peroneal trunk. There is no significant stenosis and there is three-vessel distal runoff.  Impression: Diffuse atherosclerotic plaque in the aorta common and external iliac arteries bilaterally. The degree of narrowing appears less than 50% without hemodynamically significant stenosis. No hemodynamically significant femoral popliteal or trifurcation level disease in either lower extremity.    Review of Previous Diagnostic Studies:   I have independently reviewed and interpreted the images with the patient and used the images and a tridimensional spine model to explain findings. I have also reviewed the reports.  MRI of the lumbar spine without contrast on 6/16/2023 revealed multilevel spondylosis.  Axial images were obtained from L3-4 down to L5-S1.  At L4-L5, the combination of disc bulge, facet hypertrophy, ligamentum flavum hypertrophy  contributes to moderate canal stenosis and lateral recess stenosis.  Mild left foraminal stenosis.  L5-S1: Disc bulge, facet hypertrophy, mild canal stenosis.  Mild right foraminal stenosis  Flexion and extension x-rays of the lumbar spine on 8/22/2023 revealed diffuse bone demineralization.  Multilevel degenerative changes.  There is no evidence of lumbar instability with flexion or extension.    Review of Systems   Cardiovascular:  Positive for leg swelling.   Musculoskeletal:  Positive for back pain and myalgias.   All other systems reviewed and are negative.        Patient Active Problem List   Diagnosis    Coronary arteriosclerosis    Diverticulitis    Gastroesophageal reflux disease    Hyperlipidemia    HTN (hypertension)    Multiple nodules of lung    Nicotine dependence    Obesity    Renal disease    S/P colostomy takedown    Type 2 diabetes mellitus without complication, without long-term current use of insulin    Lumbar stenosis with neurogenic claudication    Spondylosis of lumbar region without myelopathy or radiculopathy    Degeneration of lumbar or lumbosacral intervertebral disc    Peripheral vascular disease with claudication    Former smoker    Gait disturbance    Physical deconditioning    Sarcopenia    Diabetic polyneuropathy associated with type 2 diabetes mellitus    Ligamentum flavum hypertrophy       Past Medical History:   Diagnosis Date    Allergies     Diabetes     Diabetic polyneuropathy associated with type 2 diabetes mellitus 12/13/2023    Hypercholesterolemia     Hypertension     Low back pain     Lumbar stenosis with neurogenic claudication 9/5/2023    Lumbosacral disc disease     Shingles     Spondylosis of lumbar region without myelopathy or radiculopathy 9/5/2023         Past Surgical History:   Procedure Laterality Date    COLECTOMY PARTIAL / TOTAL           Family History   Problem Relation Age of Onset    Breast cancer Sister 50    Cancer Mother     Heart disease Father      "Hypertension Father     Cancer Sister         Breast cancer    Ovarian cancer Neg Hx          Social History     Socioeconomic History    Marital status:    Tobacco Use    Smoking status: Former     Packs/day: 1.00     Years: 44.00     Additional pack years: 0.00     Total pack years: 44.00     Types: Cigarettes     Start date: 1978     Quit date: 2022     Years since quittin.4     Passive exposure: Past    Smokeless tobacco: Never    Tobacco comments:     Stopped smoking 15 months ago   Vaping Use    Vaping Use: Former   Substance and Sexual Activity    Alcohol use: Not Currently     Comment: rare    Drug use: Never    Sexual activity: Yes     Partners: Male     Birth control/protection: Post-menopausal, Same-sex partner           Current Outpatient Medications:     atorvastatin (LIPITOR) 10 MG tablet, TAKE 1 TABLET BY MOUTH EVERY DAY, Disp: 90 tablet, Rfl: 1    chlorthalidone (HYGROTON) 25 MG tablet, TAKE 1 TABLET BY MOUTH EVERY DAY, Disp: 90 tablet, Rfl: 1    Cholecalciferol 50 MCG (2000 UT) capsule, Take 1 capsule by mouth., Disp: , Rfl:     Dietary Management Product (Rheumate) capsule, Take 1 capsule by mouth Daily., Disp: 90 capsule, Rfl: 0    irbesartan (AVAPRO) 300 MG tablet, Take 1 tablet by mouth Daily., Disp: 90 tablet, Rfl: 1    metFORMIN (GLUCOPHAGE) 500 MG tablet, TAKE 1 TABLET BY MOUTH EVERY DAY WITH BREAKFAST, Disp: 90 tablet, Rfl: 1    montelukast (SINGULAIR) 10 MG tablet, TAKE 1 TABLET BY MOUTH EVERY DAY AT NIGHT, Disp: 90 tablet, Rfl: 1    Potassium 99 MG tablet, Take  by mouth., Disp: , Rfl:     varenicline (CHANTIX) 1 MG tablet, TAKE 1 TABLET BY MOUTH TWICE A DAY, Disp: 60 tablet, Rfl: 1      Allergies   Allergen Reactions    Azithromycin GI Intolerance         Ht 167.6 cm (66\")   Wt 113 kg (248 lb 9.6 oz)   BMI 40.13 kg/m²       Physical Exam:  Constitutional: Patient appears well-developed, well-nourished, well-hydrated  HEENT: Head: Normocephalic and atraumatic  Eyes: " Conjunctivae and lids are normal  Pupils: Equal, round, reactive to light  Peripheral vascular exam: Femoral: right 2+, left 2+. Posterior tibialis: right 1+ and left 1+. Dorsalis pedis: right 0+ and left 0+. 1+ edema. Presence of varicose veins.  Musculoskeletal   Gait and station: Gait evaluation demonstrated some shuffling. Able to walk with some difficulties on heels, toes, tandem walking   Lumbar Spine: Passive and active range of motion are improved. Extension, lateral flexion, rotation of the lumbar spine did not increase or reproduce pain. Lumbar facet joint loading maneuvers are negative at this time.  Sacroiliac Joints: Drake's test and Gaenslen's test: Negative   Piriformis maneuvers: Negative   Right Hip Joint: The range of motion of the hip joint is almost full and without pain   Left Hip Joint: The range of motion of the hip joint is almost full and without pain   Palpation of the bilateral psoas tendons and iliopsoas bursas: Unrevealing   Palpation of the bilateral greater trochanters: Unrevealing   Examination of the Iliotibial band: Unrevealing   Neurological:   Patient is alert and oriented to person, place, and time.   Speech: Normal.   Cortical function: Normal mental status.   Reflex Scores:  Right patellar: 1+  Left patellar: 1+  Right Achilles: 0+  Left Achilles: 0+  Motor strength: 5/5  Motor Tone: Normal  Involuntary movements: None.   Superficial/Primitive Reflexes: Primitive reflexes were absent.   Right Senior: Absent  Left Senior: Absent  Right ankle clonus: Absent  Left ankle clonus: Absent   Babinsky: Absent  Long tract signs: Negative. Straight leg raising test: Negative. Femoral stretch sign: Negative.   Sensory exam: Intact to light touch, intact pain and temperature sensation, intact vibration sensation and normal proprioception  Coordination: Finger to nose: Normal. Balance: Normal Romberg's sign: Negative   Skin and subcutaneous tissue: Skin is warm and intact. No rash noted.  No cyanosis.   Psychiatric: Judgment and insight: Normal. Recent and remote memory: Intact. Mood and affect: Normal.     ASSESSMENT:   1. Lumbar stenosis with neurogenic claudication    2. Ligamentum flavum hypertrophy    3. Spondylosis of lumbar region without myelopathy or radiculopathy    4. Degeneration of lumbar or lumbosacral intervertebral disc    5. Diabetic polyneuropathy associated with type 2 diabetes mellitus    6. Peripheral vascular disease with claudication    7. Type 2 diabetes mellitus without complication, without long-term current use of insulin    8. Sarcopenia    9. Former smoker    10. Physical deconditioning    11. Gait disturbance        PLAN/MEDICAL DECISION MAKING:  Ms. Aury Silverio, 63 y.o. female originally referred by Wilian Mazariegos PA-C in consultation for chronic intractable lower back pain. Aury Silverio presented with a 10-year history of progressively worsening lower back and bilateral lower extremity pain, left worse than right, which began without incident. MRI of the lumbar spine without contrast on 06/16/2023 revealed multilevel spondylosis. At L4-L5, disc bulge, facet hypertrophy, ligamentum flavum hypertrophy contributing to moderate spinal canal stenosis and lateral recess stenosis. Mild left foraminal stenosis. At L5-S1: Disc bulge, facet hypertrophy. Mild canal stenosis. Mild right foraminal stenosis. Flexion and extension x-rays of the lumbar spine on 08/22/2023 revealed diffuse bone demineralization. Multilevel degenerative changes. There is no evidence of lumbar instability with flexion or extension. Aury Silverio has failed to obtain pain relief with conservative measures for more than 12 months including oral analgesics, topical analgesics, ice, heat, physical therapy, physical therapist directed home exercise program HEP, to name a few. Pain progressed in intensity over the past year. Aury Silverio underwent  on 09/18/2023 diagnostic and therapeutic bilateral L4-L5 transforaminal epidural steroid injections, from which she has experienced 80% pain relief and functional improvement that lasted for 4 weeks. Since her initial visit, she completed an EMG/NCV on 12/07/2023 revealed moderate axonal sensorimotor neuropathy. Chronic left L5 radiculopathy. Arterial duplex doppler of the lower extremities on 10/24/2023 revealed moderately decreased ABIs in both lower extremities. She underwent cardiology consultation with Dr. Snow on 11/13/2023, who recommended  CT abdominal aorta with iliofemoral runoff due to evidence of PAD on arterial duplex doppler/decreased ABIs. CT angiogram of the abdominal aorta with iliofemoral runoff on 12/12/2023 revealed diffuse atherosclerotic plaque in the aorta common and external iliac arteries bilaterally. The degree of narrowing appears less than 50% without hemodynamically significant stenosis. No hemodynamically significant femoral popliteal or trifurcation level disease in either lower extremity. Patient presents with multifactorial pain involving LSS with claudication, polyneuropathy and PAD. A comprehensive evaluation including history and physical exam along with pertinent physiologic and functional assessment was performed. Patient presents with intractable pain due to the diagnoses listed above. Patient has failed to respond to conservative modalities, as referenced under HPI. I have documented the impact of patient's moderate-to-severe pain contributing to significant impairment in daily activities, ADLs, and a negative impact on the patient's quality of life, as reflected on Global pain scale 13/100 (down from 40/100); The [place] Back Pain Disability Scale 55/100 (up from 49/100). I have reviewed pertinent supporting diagnostic studies of patient's chronic pain condition as well as all available pertinent medical records to patient's chronic pain condition including previous therapies,  as referenced above. PHQ-2 Depression Screening 0; Pain Self-Efficacy Questionnaire (PSEQ) 43/60 (increased from 21/60). I had a lengthy conversation with Ms. Aury Silverio regarding her chronic pain condition and potential therapeutic options including risks, benefits, alternative therapies, to name a few. We have discussed using a stepwise approach starting with the least intense level of care as determined by the extent required to diagnose and or treat a patient's condition. The treatments proposed are consistent with the patient's medical condition and are known to be as safe and effective by current guidelines and standard of care. The duration and frequency proposed are considered appropriate for the service in accordance with accepted standards of medical practice for the diagnosis and treatment of the patient's condition and intended to improve the patient's level of function. These services will be furnished in a setting appropriate to the patient's medical needs and condition. Therefore, I have proposed the following plan:    1. Interventional pain management measures: Patient does not take blood thinners. We have discussed prospects of a MILD procedure bilaterally at L4-L5 as ligamentum flavum hypertrophy is the main contributor of her LSS. Patient is dealing with lumbar spinal stenosis with neurogenic claudication, PAD with vascular claudication, and painful diabetic peripheral polyneuropathy, for which she appears to be an ideal candidate for a spinal cord stimulator trial with [employer] or [employer]. Patient has received formal education from me including risks, benefits, and alternative treatments, as well as detailed information regarding the procedure and specific goals for the trial. Patient has also received didactic materials including educational booklets and DVDs on spinal cord stimulation therapies. She will contact me via [employer] after meeting with Dr. Snow. She appeared  more interested in SCS. I provided didactic materials about MILD and SCS. She will need to complete a thoracic MRI w/o, labs, psych clearance prior to SCS.    2. Diagnostic studies: Prior to MILD, SCS trial: CBC, PT, PTT, and MRI of the thoracic spine w/o constrast to assess spinal canal and epidural space     3. Pharmacological measures: Reviewed and discussed;   A. Continue Rheumate one tablet once daily  B. Continue prilocaine 2%, lidocaine 10%, imipramine 3%, capsaicin 0.001%, and mannitol 20% cream, apply 1 to 2 grams of cream to the affected areas every 4 to 6 hours as needed  C. Patient declined a trial with conventional analgesics (duloxetine, venlafaxine, gabapentin, etc)    4. Long-term rehabilitation efforts:  A. The patient does not have a history of falls. Also, I performed a risk assessment for falls using the Tinetti gait & balance assessment tool (scored 24/28; low risk for falls).   B. Patient has completed a comprehensive physical therapy   C. Start a low impact exercise program such as water therapy, swimming  D. Contrast therapy: Apply ice-packs for 15-20 minutes, followed by heating pads for 15-20 minutes to affected area   E.  Prior to SCS: Patient will need a referral to Dr. Flavio Allen for psychological screening for spinal cord stimulation and intrathecal therapies.  F.  Referral to [facility]. Patient's Body mass index is 40.22 kg/m². Patient counseled on the importance of weight loss to help with overall health and pain control.   NOAH Silverio  reports that she quit smoking about 17 months ago. Her smoking use included cigarettes. She started smoking about 45 years ago. She has a 44.00 pack-year smoking history. She has been exposed to tobacco smoke. She has never used smokeless tobacco. She takes Chantix     5. Referrals: Follow-up with Dr. Snow for PAD.     6. The patient has been instructed to contact my office with any questions  or difficulties. The patient understands the plan and agrees to proceed accordingly.    The patient has a documented plan of care to address chronic pain. Aury Silverio reports a pain score of 4/10.  Given her pain assessment as noted, treatment options were discussed and the following options were decided upon as a follow-up plan to address the patient's pain: continuation of current treatment plan for pain, educational materials on pain management, home exercises and therapy, patient declined analgesics, patient not interested in pharmacological measures, prescription for non-opiod analgesics, prescription for opiod analgesics, referral to Physical Therapy, referral to specialist for assistance in pain treatment guidance, steroid injections, use of non-medical modalities (ice, heat, stretching and/or behavior modifications), and interventional pain management measures including neuromodulation techniques .      Pain being managed through stretching/exercise.     Pain Management Panel           No data to display                 SANDY query complete. SANDY reviewed by Neo Vazquez MD.        No orders of the defined types were placed in this encounter.     Please note that portions of this note were completed with a voice recognition program.   Any copied data in any portion of my note has been reviewed by myself and accurate.     The 21st Century Cures Act makes medical notes like this available to patients in the interest of transparency. This is a medical document intended as peer to peer communication. It is written in medical language and may contain abbreviations or verbiage that are unfamiliar. It may appear blunt or direct. Medical documents are intended to carry relevant information, facts as evident, and the clinical opinion of the practitioner.     Neo Vazquez MD    Patient Care Team:  Silvia Schulz APRN as PCP - General (Nurse Practitioner)  Neo Vazquez MD as  Consulting Physician (Pain Medicine)  Eligio Snow MD as Consulting Physician (Cardiology)     No orders of the defined types were placed in this encounter.        Future Appointments   Date Time Provider Department Center   12/30/2024 10:45 AM Eliigo Snow MD [facility]      \

## 2023-12-14 NOTE — TELEPHONE ENCOUNTER
----- Message from Eligio Snow MD sent at 12/14/2023 12:36 PM EST -----  CTA with runoff without severe focal blockage.  Mild diffuse plaque.  Recommend trial of cilostazol to 50 mg twice daily for symptoms and increasing atorvastatin to 20 mg nightly for more aggressive cholesterol control.  Encourage healthy diet and exercise as tolerated.  Keep follow-up      Sent a message about this patient's CTA with runoff. Forgot to mention that there was an incidental liver finding. Please let her know that she has a liver cyst and should discuss this with her PCP. Not an emergency but should proceed with doing so as able           ----- Message -----  From: Interface, Rad Results [place] In  Sent: 12/13/2023   4:48 PM EST  To: Eligio Snow MD

## 2023-12-14 NOTE — TELEPHONE ENCOUNTER
Discussed with patient. All questions answered at this time. Patient is agreeable to recommendations.

## 2023-12-15 ENCOUNTER — PATIENT MESSAGE (OUTPATIENT)
Dept: FAMILY MEDICINE CLINIC | Facility: CLINIC | Age: 63
End: 2023-12-15
Payer: MEDICARE

## 2023-12-29 ENCOUNTER — OFFICE VISIT (OUTPATIENT)
Dept: FAMILY MEDICINE CLINIC | Facility: CLINIC | Age: 63
End: 2023-12-29
Payer: MEDICARE

## 2023-12-29 VITALS
SYSTOLIC BLOOD PRESSURE: 110 MMHG | HEIGHT: 66 IN | HEART RATE: 84 BPM | WEIGHT: 250 LBS | DIASTOLIC BLOOD PRESSURE: 82 MMHG | OXYGEN SATURATION: 95 % | BODY MASS INDEX: 40.18 KG/M2

## 2023-12-29 DIAGNOSIS — K21.9 GASTROESOPHAGEAL REFLUX DISEASE WITHOUT ESOPHAGITIS: ICD-10-CM

## 2023-12-29 DIAGNOSIS — Z12.2 SCREENING FOR LUNG CANCER: ICD-10-CM

## 2023-12-29 DIAGNOSIS — E11.9 TYPE 2 DIABETES MELLITUS WITHOUT COMPLICATION, WITHOUT LONG-TERM CURRENT USE OF INSULIN: ICD-10-CM

## 2023-12-29 DIAGNOSIS — Z87.891 PERSONAL HISTORY OF NICOTINE DEPENDENCE: ICD-10-CM

## 2023-12-29 DIAGNOSIS — E78.2 MIXED HYPERLIPIDEMIA: ICD-10-CM

## 2023-12-29 DIAGNOSIS — I10 PRIMARY HYPERTENSION: ICD-10-CM

## 2023-12-29 DIAGNOSIS — I25.10 CORONARY ARTERIOSCLEROSIS: ICD-10-CM

## 2023-12-29 DIAGNOSIS — E56.9 VITAMIN DEFICIENCY: ICD-10-CM

## 2023-12-29 DIAGNOSIS — M47.816 SPONDYLOSIS OF LUMBAR REGION WITHOUT MYELOPATHY OR RADICULOPATHY: ICD-10-CM

## 2023-12-29 DIAGNOSIS — J30.1 SEASONAL ALLERGIC RHINITIS DUE TO POLLEN: ICD-10-CM

## 2023-12-29 DIAGNOSIS — Z00.00 INITIAL MEDICARE ANNUAL WELLNESS VISIT: Primary | ICD-10-CM

## 2023-12-29 DIAGNOSIS — R53.83 OTHER FATIGUE: ICD-10-CM

## 2023-12-29 DIAGNOSIS — R16.0 LIVER MASS: ICD-10-CM

## 2023-12-29 DIAGNOSIS — E55.9 VITAMIN D DEFICIENCY: ICD-10-CM

## 2023-12-29 DIAGNOSIS — J30.1 ALLERGIC RHINITIS DUE TO POLLEN, UNSPECIFIED SEASONALITY: ICD-10-CM

## 2023-12-29 DIAGNOSIS — I73.9 PERIPHERAL VASCULAR DISEASE WITH CLAUDICATION: ICD-10-CM

## 2023-12-29 DIAGNOSIS — M51.37 DEGENERATION OF LUMBAR OR LUMBOSACRAL INTERVERTEBRAL DISC: ICD-10-CM

## 2023-12-29 RX ORDER — IRBESARTAN 300 MG/1
300 TABLET ORAL DAILY
Qty: 90 TABLET | Refills: 1 | Status: SHIPPED | OUTPATIENT
Start: 2023-12-29

## 2023-12-29 RX ORDER — MONTELUKAST SODIUM 10 MG/1
10 TABLET ORAL
Qty: 90 TABLET | Refills: 1 | Status: SHIPPED | OUTPATIENT
Start: 2023-12-29

## 2023-12-29 RX ORDER — CHLORTHALIDONE 25 MG/1
25 TABLET ORAL DAILY
Qty: 90 TABLET | Refills: 1 | Status: SHIPPED | OUTPATIENT
Start: 2023-12-29

## 2023-12-29 NOTE — PROGRESS NOTES
The ABCs of the Annual Wellness Visit  [place] to [place] Visit    Subjective     Aury Silverio is a 63 y.o. female who presents for a  Welcome to Medicare Visit.    The following portions of the patient's history were reviewed and   updated as appropriate: past family history, past social history, and past surgical history.     Compared to one year ago, the patient feels her physical   health is better.    Compared to one year ago, the patient feels her mental   health is worse.    Recent Hospitalizations:  She was not admitted to the hospital during the last year.       Current Medical Providers:  Patient Care Team:  Silvia Schulz APRN as PCP - General (Nurse Practitioner)  Neo Vazquez MD as Consulting Physician (Pain Medicine)  Eligio Snow MD as Consulting Physician (Cardiology)    Outpatient Medications Prior to Visit   Medication Sig Dispense Refill    atorvastatin (LIPITOR) 20 MG tablet Take 1 tablet by mouth Daily. 90 tablet 3    Cholecalciferol 50 MCG (2000 UT) capsule Take 1 capsule by mouth.      cilostazol (PLETAL) 50 MG tablet Take 1 tablet by mouth 2 (Two) Times a Day. 180 tablet 1    Dietary Management Product (Rheumate) capsule Take 1 capsule by mouth Daily. 90 capsule 0    Potassium 99 MG tablet Take  by mouth.      varenicline (CHANTIX) 1 MG tablet TAKE 1 TABLET BY MOUTH TWICE A DAY 60 tablet 1    chlorthalidone (HYGROTON) 25 MG tablet TAKE 1 TABLET BY MOUTH EVERY DAY 90 tablet 1    irbesartan (AVAPRO) 300 MG tablet Take 1 tablet by mouth Daily. 90 tablet 1    metFORMIN (GLUCOPHAGE) 500 MG tablet TAKE 1 TABLET BY MOUTH EVERY DAY WITH BREAKFAST 90 tablet 1    montelukast (SINGULAIR) 10 MG tablet TAKE 1 TABLET BY MOUTH EVERY DAY AT NIGHT 90 tablet 1     No facility-administered medications prior to visit.       No opioid medication identified on active medication list. I have reviewed chart for other potential  high risk medication/s and harmful drug interactions in the  "elderly.        Aspirin is not on active medication list.  Aspirin use is not indicated based on review of current medical condition/s. Risk of harm outweighs potential benefits.  .    Patient Active Problem List   Diagnosis    Coronary arteriosclerosis    Diverticulitis    Gastroesophageal reflux disease    Hyperlipidemia    HTN (hypertension)    Multiple nodules of lung    Nicotine dependence    Obesity    Renal disease    S/P colostomy takedown    Type 2 diabetes mellitus without complication, without long-term current use of insulin    Lumbar stenosis with neurogenic claudication    Spondylosis of lumbar region without myelopathy or radiculopathy    Degeneration of lumbar or lumbosacral intervertebral disc    Peripheral vascular disease with claudication    Former smoker    Gait disturbance    Physical deconditioning    Sarcopenia    Diabetic polyneuropathy associated with type 2 diabetes mellitus    Ligamentum flavum hypertrophy     Advance Care Planning  Advance Directive is not on file.  ACP discussion was held with the patient during this visit. Patient does not have an advance directive, declines further assistance.       Objective   Vitals:    12/29/23 0824   BP: 110/82   Pulse: 84   SpO2: 95%   Weight: 113 kg (250 lb)   Height: 167.6 cm (66\")     Estimated body mass index is 40.35 kg/m² as calculated from the following:    Height as of this encounter: 167.6 cm (66\").    Weight as of this encounter: 113 kg (250 lb).           Does the patient have evidence of cognitive impairment?   No         Procedures         Review of Systems   Respiratory:  Negative for shortness of breath.    Cardiovascular:  Negative for chest pain, palpitations and leg swelling.   Psychiatric/Behavioral:  Negative for depressed mood.         Physical Exam  Constitutional:       Appearance: Normal appearance.   HENT:      Head: Normocephalic.   Eyes:      Conjunctiva/sclera: Conjunctivae normal.      Pupils: Pupils are equal, round, " and reactive to light.   Cardiovascular:      Rate and Rhythm: Normal rate and regular rhythm.      Heart sounds: Normal heart sounds.   Pulmonary:      Effort: Pulmonary effort is normal.      Breath sounds: Normal breath sounds.   Abdominal:      Tenderness: There is no abdominal tenderness.   Musculoskeletal:         General: Normal range of motion.   Skin:     General: Skin is warm and dry.      Capillary Refill: Capillary refill takes less than 2 seconds.   Neurological:      General: No focal deficit present.      Mental Status: She is alert and oriented to person, place, and time.   Psychiatric:         Mood and Affect: Mood normal.         Behavior: Behavior normal.         Thought Content: Thought content normal.         Judgment: Judgment normal.          HEALTH RISK ASSESSMENT    Smoking Status:  Social History     Tobacco Use   Smoking Status Former    Packs/day: 1.00    Years: 44.00    Additional pack years: 0.00    Total pack years: 44.00    Types: Cigarettes    Start date: 1978    Quit date: 2022    Years since quittin.4    Passive exposure: Past   Smokeless Tobacco Never   Tobacco Comments    Stopped smoking 15 months ago     Alcohol Consumption:  Social History     Substance and Sexual Activity   Alcohol Use Not Currently    Comment: rare       Fall Risk Screen:    STEADI Fall Risk Assessment was completed, and patient is at LOW risk for falls.Assessment completed on:2023    Depression Screen:       2023     8:31 AM   PHQ-2/PHQ-9 Depression Screening   Little Interest or Pleasure in Doing Things 0-->not at all   Feeling Down, Depressed or Hopeless 0-->not at all   PHQ-9: Brief Depression Severity Measure Score 0       Health Habits and Functional and Cognitive Screenin/29/2023     8:00 AM   Functional & Cognitive Status   Do you have difficulty preparing food and eating? No   Do you have difficulty bathing yourself, getting dressed or grooming yourself? No   Do you  have difficulty using the toilet? No   Do you have difficulty moving around from place to place? No   Do you have trouble with steps or getting out of a bed or a chair? No   Current Diet Well Balanced Diet   Dental Exam Up to date   Eye Exam Not up to date   Exercise (times per week) 3 times per week   Current Exercises Include Gardening   Do you need help using the phone?  No   Are you deaf or do you have serious difficulty hearing?  No   Do you need help to go to places out of walking distance? No   Do you need help shopping? No   Do you need help preparing meals?  No   Do you need help with housework?  No   Do you need help with laundry? No   Do you need help taking your medications? No   Do you need help managing money? No   Do you ever drive or ride in a car without wearing a seat belt? No   Have you felt unusual stress, anger or loneliness in the last month? No   Who do you live with? Spouse   If you need help, do you have trouble finding someone available to you? No   Have you been bothered in the last four weeks by sexual problems? No   Do you have difficulty concentrating, remembering or making decisions? No       Visual Acuity:    Vision Screening    Right eye Left eye Both eyes   Without correction      With correction 20 50 20 30 20 20       Age-appropriate Screening Schedule:  Refer to the list below for future screening recommendations based on patient's age, sex and/or medical conditions. Orders for these recommended tests are listed in the plan section. The patient has been provided with a written plan.    Health Maintenance   Topic Date Due    URINE MICROALBUMIN  Never done    DXA SCAN  Never done    Pneumococcal Vaccine 65+ (1 of 2 - PCV) Never done    TDAP/TD VACCINES (1 - Tdap) Never done    LUNG CANCER SCREENING  Never done    DIABETIC FOOT EXAM  Never done    DIABETIC EYE EXAM  Never done    HEMOGLOBIN A1C  04/26/2023    INFLUENZA VACCINE  08/01/2023    LIPID PANEL  10/26/2023    ZOSTER VACCINE  (1 of 2) 12/29/2023 (Originally 8/1/2008)    COVID-19 Vaccine (4 - 2023-24 season) 12/31/2023 (Originally 9/1/2023)    BMI FOLLOWUP  09/05/2024    ANNUAL WELLNESS VISIT  12/29/2024    MAMMOGRAM  04/12/2025    HEPATITIS C SCREENING  Completed        CMS Preventative Services Quick Reference  Risk Factors Identified During Encounter    None Identified  The above risks/problems have been discussed with the patient.  Pertinent information has been shared with the patient in the After Visit Summary.  Follow up plans and orders are seen below in the Assessment/Plan Section.    Diagnoses and all orders for this visit:    1. Initial Medicare annual wellness visit (Primary)  Comments:  We discussed diet, exercise, vaccines, and prev counseling. Pt will schedule DEXA with mammo in April. Return for fasting labs.    2. Primary hypertension  Comments:  Continue current medications.  Monitor blood pressure and keep log.  We discussed diet and exercise.  Orders:  -     chlorthalidone (HYGROTON) 25 MG tablet; Take 1 tablet by mouth Daily.  Dispense: 90 tablet; Refill: 1  -     irbesartan (AVAPRO) 300 MG tablet; Take 1 tablet by mouth Daily.  Dispense: 90 tablet; Refill: 1    3. Type 2 diabetes mellitus without complication, without long-term current use of insulin  Comments:  Continue current medications.  We discussed diet and exercise.   Orders:  -     metFORMIN (GLUCOPHAGE) 500 MG tablet; Take 1 tablet by mouth Daily With Breakfast.  Dispense: 90 tablet; Refill: 1  -     Hemoglobin A1c    4. Allergic rhinitis due to pollen, unspecified seasonality  Comments:  Continue current medications.  Orders:  -     montelukast (SINGULAIR) 10 MG tablet; Take 1 tablet by mouth every night at bedtime.  Dispense: 90 tablet; Refill: 1    5. Mixed hyperlipidemia  Comments:  Continue current meds.  Orders:  -     CBC & Differential  -     Comprehensive Metabolic Panel  -     Lipid Panel    6. Seasonal allergic rhinitis due to  pollen  Comments:  Continue current meds.    7. Vitamin D deficiency  -     Vitamin D,25-Hydroxy    8. Vitamin deficiency  -     Vitamin B12  -     Folate    9. Other fatigue  -     TSH    10. Screening for lung cancer  Comments:  Screening ordered.  Orders:  -     CT Chest Low Dose Wo; Future    11. Personal history of nicotine dependence  Comments:  screening ordered.  Orders:  -     CT Chest Low Dose Wo; Future    12. Spondylosis of lumbar region without myelopathy or radiculopathy  Comments:  F/U with pain management.    13. Coronary arteriosclerosis  Comments:  Continue current meds.    14. Peripheral vascular disease with claudication  Comments:  Continue current meds.    15. Gastroesophageal reflux disease without esophagitis    16. Degeneration of lumbar or lumbosacral intervertebral disc    17. Liver mass  Comments:  MRI ordered.  Orders:  -     MRI Abdomen With & Without Contrast; Future    Other orders  -     Pneumococcal Conjugate Vaccine 20-Valent (PCV20)  -     Fluzone High-Dose 65+yrs ([id number])        Follow Up:   Initial Medicare Visit in one year    An After Visit Summary and PPPS were made available to the patient.

## 2023-12-29 NOTE — PROGRESS NOTES
"Chief Complaint  Annual Exam    Subjective          Aury Silverio presents to [facility]  History of Present Illness  Pt is here to follow up on DM and HTN. She has been trying to watch her diet. She had a liver mass on a recent CT from cardio. She has been taking Chantix but smokes occasionally. Her BP has been controlled. She has been seeing interventional pain management for low back pain.       Objective   Vital Signs:   /82   Pulse 84   Ht 167.6 cm (66\")   Wt 113 kg (250 lb)   SpO2 95%   BMI 40.35 kg/m²     Body mass index is 40.35 kg/m².    Review of Systems   Constitutional:  Negative for fatigue and fever.   Respiratory:  Negative for shortness of breath.    Cardiovascular:  Negative for chest pain, palpitations and leg swelling.   Neurological:  Negative for syncope.   Psychiatric/Behavioral:  The patient is not nervous/anxious.           Current Outpatient Medications:     atorvastatin (LIPITOR) 20 MG tablet, Take 1 tablet by mouth Daily., Disp: 90 tablet, Rfl: 3    chlorthalidone (HYGROTON) 25 MG tablet, Take 1 tablet by mouth Daily., Disp: 90 tablet, Rfl: 1    Cholecalciferol 50 MCG (2000 UT) capsule, Take 1 capsule by mouth., Disp: , Rfl:     cilostazol (PLETAL) 50 MG tablet, Take 1 tablet by mouth 2 (Two) Times a Day., Disp: 180 tablet, Rfl: 1    Dietary Management Product (Rheumate) capsule, Take 1 capsule by mouth Daily., Disp: 90 capsule, Rfl: 0    irbesartan (AVAPRO) 300 MG tablet, Take 1 tablet by mouth Daily., Disp: 90 tablet, Rfl: 1    metFORMIN (GLUCOPHAGE) 500 MG tablet, Take 1 tablet by mouth Daily With Breakfast., Disp: 90 tablet, Rfl: 1    montelukast (SINGULAIR) 10 MG tablet, Take 1 tablet by mouth every night at bedtime., Disp: 90 tablet, Rfl: 1    Potassium 99 MG tablet, Take  by mouth., Disp: , Rfl:     varenicline (CHANTIX) 1 MG tablet, TAKE 1 TABLET BY MOUTH TWICE A DAY, Disp: 60 tablet, Rfl: 1      Allergies: " Azithromycin    Physical Exam  Constitutional:       Appearance: Normal appearance.   HENT:      Head: Normocephalic.   Eyes:      Conjunctiva/sclera: Conjunctivae normal.      Pupils: Pupils are equal, round, and reactive to light.   Cardiovascular:      Rate and Rhythm: Normal rate and regular rhythm.      Heart sounds: Normal heart sounds.   Pulmonary:      Effort: Pulmonary effort is normal.      Breath sounds: Normal breath sounds.   Abdominal:      Tenderness: There is no abdominal tenderness.   Musculoskeletal:         General: Normal range of motion.   Skin:     General: Skin is warm and dry.      Capillary Refill: Capillary refill takes less than 2 seconds.   Neurological:      General: No focal deficit present.      Mental Status: She is alert and oriented to person, place, and time.   Psychiatric:         Mood and Affect: Mood normal.         Behavior: Behavior normal.         Thought Content: Thought content normal.         Judgment: Judgment normal.          Result Review :                   Assessment and Plan    Diagnoses and all orders for this visit:    1. Initial Medicare annual wellness visit (Primary)  Comments:  We discussed diet, exercise, vaccines, and prev counseling. Pt will schedule DEXA with mammo in April. Return for fasting labs.    2. Primary hypertension  Comments:  Continue current medications.  Monitor blood pressure and keep log.  We discussed diet and exercise.  Orders:  -     chlorthalidone (HYGROTON) 25 MG tablet; Take 1 tablet by mouth Daily.  Dispense: 90 tablet; Refill: 1  -     irbesartan (AVAPRO) 300 MG tablet; Take 1 tablet by mouth Daily.  Dispense: 90 tablet; Refill: 1    3. Type 2 diabetes mellitus without complication, without long-term current use of insulin  Comments:  Continue current medications.  We discussed diet and exercise.   Orders:  -     metFORMIN (GLUCOPHAGE) 500 MG tablet; Take 1 tablet by mouth Daily With Breakfast.  Dispense: 90 tablet; Refill: 1  -      Hemoglobin A1c    4. Allergic rhinitis due to pollen, unspecified seasonality  Comments:  Continue current medications.  Orders:  -     montelukast (SINGULAIR) 10 MG tablet; Take 1 tablet by mouth every night at bedtime.  Dispense: 90 tablet; Refill: 1    5. Mixed hyperlipidemia  Comments:  Continue current meds.  Orders:  -     CBC & Differential  -     Comprehensive Metabolic Panel  -     Lipid Panel    6. Seasonal allergic rhinitis due to pollen  Comments:  Continue current meds.    7. Vitamin D deficiency  -     Vitamin D,25-Hydroxy    8. Vitamin deficiency  -     Vitamin B12  -     Folate    9. Other fatigue  -     TSH    10. Screening for lung cancer  Comments:  Screening ordered.  Orders:  -     CT Chest Low Dose Wo; Future    11. Personal history of nicotine dependence  Comments:  screening ordered.  Orders:  -     CT Chest Low Dose Wo; Future    12. Spondylosis of lumbar region without myelopathy or radiculopathy  Comments:  F/U with pain management.    13. Coronary arteriosclerosis  Comments:  Continue current meds.    14. Peripheral vascular disease with claudication  Comments:  Continue current meds.    15. Gastroesophageal reflux disease without esophagitis    16. Degeneration of lumbar or lumbosacral intervertebral disc    17. Liver mass  Comments:  MRI ordered.  Orders:  -     MRI Abdomen With & Without Contrast; Future    Other orders  -     Pneumococcal Conjugate Vaccine 20-Valent (PCV20)  -     Fluzone High-Dose 65+yrs ([id number])                Follow Up   Return in about 6 months (around 6/29/2024) for Recheck.  Patient was given instructions and counseling regarding her condition or for health maintenance advice. Please see specific information pulled into the AVS if appropriate.     JUD Rizzo

## 2024-01-05 ENCOUNTER — LAB (OUTPATIENT)
Dept: FAMILY MEDICINE CLINIC | Facility: CLINIC | Age: 64
End: 2024-01-05
Payer: MEDICARE

## 2024-01-06 LAB
25(OH)D3+25(OH)D2 SERPL-MCNC: 50 NG/ML (ref 30–100)
ALBUMIN SERPL-MCNC: 4 G/DL (ref 3.9–4.9)
ALBUMIN/GLOB SERPL: 1.5 {RATIO} (ref 1.2–2.2)
ALP SERPL-CCNC: 57 IU/L (ref 44–121)
ALT SERPL-CCNC: 19 IU/L (ref 0–32)
AST SERPL-CCNC: 16 IU/L (ref 0–40)
BASOPHILS # BLD AUTO: 0.1 X10E3/UL (ref 0–0.2)
BASOPHILS NFR BLD AUTO: 1 %
BILIRUB SERPL-MCNC: 0.4 MG/DL (ref 0–1.2)
BUN SERPL-MCNC: 15 MG/DL (ref 8–27)
BUN/CREAT SERPL: 14 (ref 12–28)
CALCIUM SERPL-MCNC: 9.6 MG/DL (ref 8.7–10.3)
CHLORIDE SERPL-SCNC: 99 MMOL/L (ref 96–106)
CHOLEST SERPL-MCNC: 109 MG/DL (ref 100–199)
CO2 SERPL-SCNC: 26 MMOL/L (ref 20–29)
CREAT SERPL-MCNC: 1.07 MG/DL (ref 0.57–1)
EGFRCR SERPLBLD CKD-EPI 2021: 58 ML/MIN/1.73
EOSINOPHIL # BLD AUTO: 0.1 X10E3/UL (ref 0–0.4)
EOSINOPHIL NFR BLD AUTO: 2 %
ERYTHROCYTE [DISTWIDTH] IN BLOOD BY AUTOMATED COUNT: 15.5 % (ref 11.7–15.4)
FOLATE SERPL-MCNC: >20 NG/ML
GLOBULIN SER CALC-MCNC: 2.6 G/DL (ref 1.5–4.5)
GLUCOSE SERPL-MCNC: 108 MG/DL (ref 70–99)
HBA1C MFR BLD: 7.1 % (ref 4.8–5.6)
HCT VFR BLD AUTO: 36.9 % (ref 34–46.6)
HDLC SERPL-MCNC: 38 MG/DL
HGB BLD-MCNC: 11.5 G/DL (ref 11.1–15.9)
IMM GRANULOCYTES # BLD AUTO: 0 X10E3/UL (ref 0–0.1)
IMM GRANULOCYTES NFR BLD AUTO: 1 %
LDLC SERPL CALC-MCNC: 54 MG/DL (ref 0–99)
LYMPHOCYTES # BLD AUTO: 1.6 X10E3/UL (ref 0.7–3.1)
LYMPHOCYTES NFR BLD AUTO: 23 %
MCH RBC QN AUTO: 25.7 PG (ref 26.6–33)
MCHC RBC AUTO-ENTMCNC: 31.2 G/DL (ref 31.5–35.7)
MCV RBC AUTO: 82 FL (ref 79–97)
MONOCYTES # BLD AUTO: 0.4 X10E3/UL (ref 0.1–0.9)
MONOCYTES NFR BLD AUTO: 5 %
NEUTROPHILS # BLD AUTO: 4.7 X10E3/UL (ref 1.4–7)
NEUTROPHILS NFR BLD AUTO: 68 %
PLATELET # BLD AUTO: 304 X10E3/UL (ref 150–450)
POTASSIUM SERPL-SCNC: 3.9 MMOL/L (ref 3.5–5.2)
PROT SERPL-MCNC: 6.6 G/DL (ref 6–8.5)
RBC # BLD AUTO: 4.48 X10E6/UL (ref 3.77–5.28)
SODIUM SERPL-SCNC: 141 MMOL/L (ref 134–144)
TRIGL SERPL-MCNC: 88 MG/DL (ref 0–149)
TSH SERPL DL<=0.005 MIU/L-ACNC: 3.3 UIU/ML (ref 0.45–4.5)
VIT B12 SERPL-MCNC: 1082 PG/ML (ref 232–1245)
VLDLC SERPL CALC-MCNC: 17 MG/DL (ref 5–40)
WBC # BLD AUTO: 6.8 X10E3/UL (ref 3.4–10.8)

## 2024-01-11 DIAGNOSIS — M48.062 LUMBAR STENOSIS WITH NEUROGENIC CLAUDICATION: ICD-10-CM

## 2024-01-11 RX ORDER — CEPHRADINE 500 MG
2 CAPSULE ORAL 3 TIMES DAILY
Refills: 1 | OUTPATIENT
Start: 2024-01-11

## 2024-02-02 ENCOUNTER — OFFICE VISIT (OUTPATIENT)
Dept: FAMILY MEDICINE CLINIC | Facility: CLINIC | Age: 64
End: 2024-02-02
Payer: MEDICARE

## 2024-02-02 VITALS
HEIGHT: 66 IN | SYSTOLIC BLOOD PRESSURE: 124 MMHG | DIASTOLIC BLOOD PRESSURE: 72 MMHG | HEART RATE: 79 BPM | OXYGEN SATURATION: 98 % | BODY MASS INDEX: 39.37 KG/M2 | WEIGHT: 245 LBS

## 2024-02-02 DIAGNOSIS — Z13.820 SCREENING FOR OSTEOPOROSIS: ICD-10-CM

## 2024-02-02 DIAGNOSIS — E11.9 TYPE 2 DIABETES MELLITUS WITHOUT COMPLICATION, WITHOUT LONG-TERM CURRENT USE OF INSULIN: ICD-10-CM

## 2024-02-02 DIAGNOSIS — I10 PRIMARY HYPERTENSION: ICD-10-CM

## 2024-02-02 DIAGNOSIS — J30.1 ALLERGIC RHINITIS DUE TO POLLEN, UNSPECIFIED SEASONALITY: ICD-10-CM

## 2024-02-02 DIAGNOSIS — Z78.0 POSTMENOPAUSAL: Primary | ICD-10-CM

## 2024-02-02 DIAGNOSIS — Z12.31 ENCOUNTER FOR SCREENING MAMMOGRAM FOR MALIGNANT NEOPLASM OF BREAST: ICD-10-CM

## 2024-02-02 PROCEDURE — 3051F HG A1C>EQUAL 7.0%<8.0%: CPT | Performed by: INTERNAL MEDICINE

## 2024-02-02 PROCEDURE — 1160F RVW MEDS BY RX/DR IN RCRD: CPT | Performed by: INTERNAL MEDICINE

## 2024-02-02 PROCEDURE — 3074F SYST BP LT 130 MM HG: CPT | Performed by: INTERNAL MEDICINE

## 2024-02-02 PROCEDURE — 99214 OFFICE O/P EST MOD 30 MIN: CPT | Performed by: INTERNAL MEDICINE

## 2024-02-02 PROCEDURE — 3078F DIAST BP <80 MM HG: CPT | Performed by: INTERNAL MEDICINE

## 2024-02-02 PROCEDURE — 1159F MED LIST DOCD IN RCRD: CPT | Performed by: INTERNAL MEDICINE

## 2024-02-02 NOTE — PATIENT INSTRUCTIONS
If you have not already done so, I recommend and encourage that you request to receive the Shingrix (Shingles vaccine), Tdap (Tetanus  and whooping cough booster), and COVID-19 vaccines (you may need the newest bi-valent booster) at  your pharmacy.  They can administer this at your convenience without a prescription.    If you have already received these, or if you get them in the future, please be sure to provide us a copy of your immunization record so that we can update your records.    They can administer this at your convenience without a prescription.

## 2024-02-02 NOTE — PROGRESS NOTES
Office Note     Name: Aury Silverio    : 1960     MRN: [id number]     Chief Complaint  Establish Care (Pt ise here to establish care today. )    Subjective     History of Present Illness:  Aury Silverio is a 63 y.o. female who presents today for:        Had herniated discs (sees Dr Vazquez with pain management, had an epidural that only helped for about 1 month, now is considering the MILD procedure to remove ligaments)  with lower extremity pain but was also found to have PAD so was on Cilostazol in the past month or so.    Diabetes: Last A1C was last month, had gone up slightly, was on daily metformin at that time.  Patient is currently taking all medications as directed wants to cut down on carbs and sugar.  Patient denies any nonhealing skin wounds or ulcers.  Patient denies any changes in vision.  Patient's diabetic eye exam is due    Review of Systems:   Review of Systems    Past Medical History:   Past Medical History:   Diagnosis Date    Allergic     Allergies     Diabetes     Diabetic polyneuropathy associated with type 2 diabetes mellitus 2023    Diverticulosis     Hypercholesterolemia     Hypertension     Low back pain     Lumbar stenosis with neurogenic claudication 2023    Lumbosacral disc disease     Shingles     Spondylosis of lumbar region without myelopathy or radiculopathy 2023       Past Surgical History:   Past Surgical History:   Procedure Laterality Date    CHOLECYSTECTOMY      2022    COLECTOMY PARTIAL / TOTAL      COLONOSCOPY      2022    HERNIA REPAIR      2022    INGUINAL HERNIA REPAIR         Family History:   Family History   Problem Relation Age of Onset    Breast cancer Sister 50    Cancer Mother     Heart disease Father     Hypertension Father     Cancer Sister         Breast cancer    Ovarian cancer Neg Hx        Social History:   Social History     Socioeconomic History    Marital status:    Tobacco Use     Smoking status: Former     Packs/day: 1.00     Years: 44.00     Additional pack years: 0.00     Total pack years: 44.00     Types: Cigarettes     Start date: 1978     Quit date: 2022     Years since quittin.6     Passive exposure: Past    Smokeless tobacco: Never    Tobacco comments:     Stopped smoking 15 months ago   Vaping Use    Vaping Use: Never used   Substance and Sexual Activity    Alcohol use: Not Currently     Comment: rare    Drug use: Never    Sexual activity: Yes     Partners: Male     Birth control/protection: Post-menopausal       Immunizations:   Immunization History   Administered Date(s) Administered    COVID-19 (MODERNA) 1st,2nd,3rd Dose Monovalent 2021, 2021    COVID-19 (MODERNA) Monovalent Original Booster 2022    Fluzone (or Fluarix & Flulaval for VFC) >6mos 2022    Fluzone High-Dose 65+yrs 2023    Pneumococcal Conjugate 20-Valent (PCV20) 2023        Medications:     Current Outpatient Medications:     atorvastatin (LIPITOR) 20 MG tablet, Take 1 tablet by mouth Daily., Disp: 90 tablet, Rfl: 3    chlorthalidone (HYGROTON) 25 MG tablet, Take 1 tablet by mouth Daily., Disp: 90 tablet, Rfl: 1    Cholecalciferol 50 MCG ( UT) capsule, Take 1 capsule by mouth., Disp: , Rfl:     cilostazol (PLETAL) 50 MG tablet, Take 1 tablet by mouth 2 (Two) Times a Day., Disp: 180 tablet, Rfl: 1    irbesartan (AVAPRO) 300 MG tablet, Take 1 tablet by mouth Daily., Disp: 90 tablet, Rfl: 1    metFORMIN (GLUCOPHAGE) 500 MG tablet, Take 1 tablet by mouth Daily With Breakfast., Disp: 90 tablet, Rfl: 1    montelukast (SINGULAIR) 10 MG tablet, Take 1 tablet by mouth every night at bedtime., Disp: 90 tablet, Rfl: 1    Potassium 99 MG tablet, Take  by mouth., Disp: , Rfl:     varenicline (CHANTIX) 1 MG tablet, TAKE 1 TABLET BY MOUTH TWICE A DAY, Disp: 60 tablet, Rfl: 1    Allergies:   Allergies   Allergen Reactions    Azithromycin GI Intolerance       Objective     Vital  "Signs  /72   Pulse 79   Ht 167.6 cm (66\")   Wt 111 kg (245 lb)   SpO2 98%   BMI 39.54 kg/m²   Estimated body mass index is 39.54 kg/m² as calculated from the following:    Height as of this encounter: 167.6 cm (66\").    Weight as of this encounter: 111 kg (245 lb).            Physical Exam  Vitals and nursing note reviewed.   Constitutional:       Appearance: Normal appearance.   Cardiovascular:      Rate and Rhythm: Normal rate and regular rhythm.      Heart sounds: No murmur heard.     No friction rub. No gallop.   Pulmonary:      Effort: Pulmonary effort is normal.      Breath sounds: Normal breath sounds. No wheezing, rhonchi or rales.   Neurological:      Mental Status: She is alert.          Procedures     Assessment and Plan     1. Postmenopausal    - DEXA Bone Density Axial    2. Screening for osteoporosis    - DEXA Bone Density Axial    3. Encounter for screening mammogram for malignant neoplasm of breast    - Mammo Screening Digital Tomosynthesis Bilateral With CAD; Future    4. Primary hypertension    - chlorthalidone (HYGROTON) 25 MG tablet; Take 1 tablet by mouth Daily.  Dispense: 90 tablet; Refill: 1  - irbesartan (AVAPRO) 300 MG tablet; Take 1 tablet by mouth Daily.  Dispense: 90 tablet; Refill: 1    5. Allergic rhinitis due to pollen, unspecified seasonality    - montelukast (SINGULAIR) 10 MG tablet; Take 1 tablet by mouth every night at bedtime.  Dispense: 90 tablet; Refill: 1    6. Type 2 diabetes mellitus without complication, without long-term current use of insulin    - metFORMIN (GLUCOPHAGE) 500 MG tablet; Take 1 tablet by mouth Daily With Breakfast.  Dispense: 90 tablet; Refill: 1       Follow Up  Return in about 3 months (around 5/2/2024).    Patient was advised to call the office or seek medical care if  any issues discussed during this visit worsen or persist or if new concerns arise        MD HARR[facility]  [address] [place] [facility] [id number] [phone]

## 2024-02-05 RX ORDER — IRBESARTAN 300 MG/1
300 TABLET ORAL DAILY
Qty: 90 TABLET | Refills: 1 | Status: SHIPPED | OUTPATIENT
Start: 2024-02-05

## 2024-02-05 RX ORDER — MONTELUKAST SODIUM 10 MG/1
10 TABLET ORAL
Qty: 90 TABLET | Refills: 1 | Status: SHIPPED | OUTPATIENT
Start: 2024-02-05

## 2024-02-05 RX ORDER — CHLORTHALIDONE 25 MG/1
25 TABLET ORAL DAILY
Qty: 90 TABLET | Refills: 1 | Status: SHIPPED | OUTPATIENT
Start: 2024-02-05

## 2024-02-12 ENCOUNTER — HOSPITAL ENCOUNTER (OUTPATIENT)
Dept: MRI IMAGING | Facility: HOSPITAL | Age: 64
Discharge: HOME | End: 2024-02-12
Payer: MEDICARE

## 2024-02-12 ENCOUNTER — HOSPITAL ENCOUNTER (OUTPATIENT)
Dept: CT IMAGING | Facility: HOSPITAL | Age: 64
Discharge: HOME | End: 2024-02-12
Payer: MEDICARE

## 2024-02-12 DIAGNOSIS — Z87.891 PERSONAL HISTORY OF NICOTINE DEPENDENCE: ICD-10-CM

## 2024-02-12 DIAGNOSIS — Z12.2 SCREENING FOR LUNG CANCER: ICD-10-CM

## 2024-02-12 DIAGNOSIS — R16.0 LIVER MASS: ICD-10-CM

## 2024-02-12 PROCEDURE — 71271 CT THORAX LUNG CANCER SCR C-: CPT

## 2024-02-12 PROCEDURE — A9577 INJ MULTIHANCE: HCPCS | Performed by: NURSE PRACTITIONER

## 2024-02-12 PROCEDURE — 0 GADOBENATE DIMEGLUMINE 529 MG/ML SOLUTION: Performed by: NURSE PRACTITIONER

## 2024-02-12 PROCEDURE — 74183 MRI ABD W/O CNTR FLWD CNTR: CPT

## 2024-02-12 RX ADMIN — GADOBENATE DIMEGLUMINE 20 ML: 529 INJECTION, SOLUTION INTRAVENOUS at 10:45

## 2024-04-08 ENCOUNTER — OFFICE VISIT (OUTPATIENT)
Dept: FAMILY MEDICINE CLINIC | Facility: CLINIC | Age: 64
End: 2024-04-08
Payer: MEDICARE

## 2024-04-08 VITALS
SYSTOLIC BLOOD PRESSURE: 108 MMHG | HEART RATE: 74 BPM | WEIGHT: 243 LBS | DIASTOLIC BLOOD PRESSURE: 68 MMHG | HEIGHT: 66 IN | BODY MASS INDEX: 39.05 KG/M2 | OXYGEN SATURATION: 100 %

## 2024-04-08 DIAGNOSIS — I10 PRIMARY HYPERTENSION: ICD-10-CM

## 2024-04-08 DIAGNOSIS — E11.9 TYPE 2 DIABETES MELLITUS WITHOUT COMPLICATION, WITHOUT LONG-TERM CURRENT USE OF INSULIN: Primary | ICD-10-CM

## 2024-04-08 DIAGNOSIS — E78.2 MIXED HYPERLIPIDEMIA: ICD-10-CM

## 2024-04-08 DIAGNOSIS — E11.42 DIABETIC POLYNEUROPATHY ASSOCIATED WITH TYPE 2 DIABETES MELLITUS: ICD-10-CM

## 2024-04-08 DIAGNOSIS — E11.9 ENCOUNTER FOR DIABETIC FOOT EXAM: ICD-10-CM

## 2024-04-08 PROBLEM — K43.9 VENTRAL HERNIA WITHOUT OBSTRUCTION OR GANGRENE: Status: ACTIVE | Noted: 2021-10-22

## 2024-04-08 PROBLEM — K76.0 STEATOSIS OF LIVER: Status: ACTIVE | Noted: 2021-10-22

## 2024-04-08 PROBLEM — Z87.42 HISTORY OF ABNORMAL CERVICAL PAPANICOLAOU SMEAR: Status: ACTIVE | Noted: 2022-01-04

## 2024-04-08 PROBLEM — N83.201 BILATERAL OVARIAN CYSTS: Status: ACTIVE | Noted: 2021-10-22

## 2024-04-08 PROBLEM — N83.202 BILATERAL OVARIAN CYSTS: Status: ACTIVE | Noted: 2021-10-22

## 2024-04-08 PROBLEM — K57.20 PERICOLONIC ABSCESS DUE TO DIVERTICULITIS: Status: ACTIVE | Noted: 2021-10-22

## 2024-04-08 LAB
EXPIRATION DATE: ABNORMAL
EXPIRATION DATE: NORMAL
HBA1C MFR BLD: 7 % (ref 4.5–5.7)
Lab: ABNORMAL
Lab: NORMAL
POC CREATININE URINE: 50
POC MICROALBUMIN URINE: 10

## 2024-04-08 NOTE — PROGRESS NOTES
Office Note     Name: Aury Silverio    : 1960     MRN: [id number]     Chief Complaint  Follow-up  DM, HTN  Subjective     History of Present Illness:  Aury Silverio is a 63 y.o. female who presents today for:    Diabetes: Last A1C was in January, had gone up slightly, was on daily metformin at that time.  Patient is currently taking all medications as directed wants to cut down on carbs and sugar.  Patient denies any nonhealing skin wounds or ulcers.  Patient denies any changes in vision.  Patient's diabetic eye exam is due , wants to est with new eye doctor. Is trying eat better and cut out carbs and sugars.    Has history of fatty liver, had a small cyst on liver and a small cyst on the kidney (right).  Had history of recurrent diverticulitis and prior abscess        Review of Systems:   Review of Systems    Past Medical History:   Past Medical History:   Diagnosis Date    Allergic     Allergies     Diabetes     Diabetic polyneuropathy associated with type 2 diabetes mellitus 2023    Diverticulosis     Hypercholesterolemia     Hypertension     Low back pain     Lumbar stenosis with neurogenic claudication 2023    Lumbosacral disc disease     Shingles     Spondylosis of lumbar region without myelopathy or radiculopathy 2023       Past Surgical History:   Past Surgical History:   Procedure Laterality Date     SECTION      1995    CHOLECYSTECTOMY      2022    COLECTOMY PARTIAL / TOTAL      COLON SURGERY      10/2021    COLONOSCOPY      2022    HERNIA REPAIR      2022    INGUINAL HERNIA REPAIR         Family History:   Family History   Problem Relation Age of Onset    Breast cancer Sister 50    Cancer Mother     Heart disease Father     Hypertension Father     Cancer Sister         Breast cancer    Ovarian cancer Neg Hx        Social History:   Social History     Socioeconomic History    Marital status:    Tobacco Use    Smoking  status: Former     Current packs/day: 0.00     Average packs/day: 1 pack/day for 44.5 years (44.5 ttl pk-yrs)     Types: Cigarettes     Start date: 1978     Quit date: 2022     Years since quittin.7     Passive exposure: Past    Smokeless tobacco: Never    Tobacco comments:     Stopped smoking 15 months ago   Vaping Use    Vaping status: Never Used   Substance and Sexual Activity    Alcohol use: Not Currently     Comment: rare    Drug use: Never    Sexual activity: Yes     Partners: Male     Birth control/protection: Post-menopausal       Immunizations:   Immunization History   Administered Date(s) Administered    COVID-19 (MODERNA) 1st,2nd,3rd Dose Monovalent 2021, 2021    COVID-19 (MODERNA) Monovalent Original Booster 2022    Fluzone (or Fluarix & Flulaval for VFC) >6mos 2022    Fluzone High-Dose 65+yrs 2023    Pneumococcal Conjugate 20-Valent (PCV20) 2023        Medications:     Current Outpatient Medications:     atorvastatin (LIPITOR) 20 MG tablet, Take 1 tablet by mouth Daily., Disp: 90 tablet, Rfl: 3    chlorthalidone (HYGROTON) 25 MG tablet, Take 1 tablet by mouth Daily., Disp: 90 tablet, Rfl: 1    Cholecalciferol 50 MCG (2000 UT) capsule, Take 1 capsule by mouth., Disp: , Rfl:     cilostazol (PLETAL) 50 MG tablet, Take 1 tablet by mouth 2 (Two) Times a Day., Disp: 180 tablet, Rfl: 1    irbesartan (AVAPRO) 300 MG tablet, Take 1 tablet by mouth Daily., Disp: 90 tablet, Rfl: 1    metFORMIN (GLUCOPHAGE) 500 MG tablet, Take 1 tablet by mouth Daily With Breakfast., Disp: 90 tablet, Rfl: 1    montelukast (SINGULAIR) 10 MG tablet, Take 1 tablet by mouth every night at bedtime., Disp: 90 tablet, Rfl: 1    Potassium 99 MG tablet, Take  by mouth., Disp: , Rfl:     varenicline (CHANTIX) 1 MG tablet, TAKE 1 TABLET BY MOUTH TWICE A DAY, Disp: 60 tablet, Rfl: 1    Allergies:   Allergies   Allergen Reactions    Azithromycin GI Intolerance       Objective     Vital Signs  BP  "108/68   Pulse 74   Ht 167.6 cm (66\")   Wt 110 kg (243 lb)   SpO2 100%   BMI 39.22 kg/m²   Estimated body mass index is 39.22 kg/m² as calculated from the following:    Height as of this encounter: 167.6 cm (66\").    Weight as of this encounter: 110 kg (243 lb).        Diabetic foot exam:   Left: Filament test  Diminished on fiest 3 toes    Pulses normal   Reflexes Normal      Proprioception normal   Sharp/dull discrimination diminished       Right: Filament test present   Pulses Dorsalis Pedis:  present  Posterior Tibial:  present   Reflexes Normal   Vibratory sensation normal   Proprioception normal   Sharp/dull discrimination normal     Physical Exam  Vitals and nursing note reviewed.   Constitutional:       Appearance: Normal appearance.   Cardiovascular:      Rate and Rhythm: Normal rate and regular rhythm.      Heart sounds: No murmur heard.     No friction rub. No gallop.   Pulmonary:      Effort: Pulmonary effort is normal.      Breath sounds: Normal breath sounds. No wheezing, rhonchi or rales.   Neurological:      Mental Status: She is alert.         Procedures     Assessment and Plan     1. Type 2 diabetes mellitus without complication, without long-term current use of insulin  Continue current medication regimen of metformin. Patient advised to have pharmacy contact the office when refills are due.   - POCT glycated hemoglobin, total  - POCT microalbumin    2. Diabetic polyneuropathy associated with type 2 diabetes mellitus      3. Encounter for diabetic foot exam  As above    4. Primary hypertension  Continue current medication regimen of irbesartan and chlorthalidone, . Patient advised to have pharmacy contact the office when refills are due.      5. Mixed hyperlipidemia  Continue current medication regimen of atorvastatin. Patient advised to have pharmacy contact the office when refills are due.           If you have not already done so, I recommend and encourage that you request to receive the " Shingrix (Shingles vaccine), Tdap (Tetanus  and whooping cough booster), RSV and COVID-19 vaccines (you may need the newest bi-valent booster) at  your pharmacy.  They can administer this at your convenience without a prescription.    If you have already received these at your pharmacy or another office, or if you get them in the future, please be sure to provide us a copy of your immunization history so that we can update your records.    Follow Up  Return in about 4 months (around 8/8/2024).    Patient was advised to call the office or seek medical care if  any issues discussed during this visit worsen or persist or if new concerns arise        MD H[facility]  [address] [place] [id number] [phone]  Answers submitted by the patient for this visit:  Other (Submitted on 4/3/2024)  Please describe your symptoms.: N/A  Have you had these symptoms before?: No  How long have you been having these symptoms?: 1-4 days  Primary Reason for Visit (Submitted on 4/3/2024)  What is the primary reason for your visit?: Other

## 2024-04-15 ENCOUNTER — HOSPITAL ENCOUNTER (OUTPATIENT)
Dept: MAMMOGRAPHY | Facility: HOSPITAL | Age: 64
Discharge: HOME | End: 2024-04-15
Payer: MEDICARE

## 2024-04-15 ENCOUNTER — HOSPITAL ENCOUNTER (OUTPATIENT)
Dept: BONE DENSITY | Facility: HOSPITAL | Age: 64
Discharge: HOME | End: 2024-04-15
Payer: MEDICARE

## 2024-04-15 DIAGNOSIS — Z12.31 ENCOUNTER FOR SCREENING MAMMOGRAM FOR MALIGNANT NEOPLASM OF BREAST: ICD-10-CM

## 2024-04-15 PROCEDURE — 77067 SCR MAMMO BI INCL CAD: CPT

## 2024-04-15 PROCEDURE — 77080 DXA BONE DENSITY AXIAL: CPT

## 2024-04-15 PROCEDURE — 77063 BREAST TOMOSYNTHESIS BI: CPT

## 2024-05-26 DIAGNOSIS — M48.062 LUMBAR STENOSIS WITH NEUROGENIC CLAUDICATION: ICD-10-CM

## 2024-05-26 DIAGNOSIS — E78.2 MIXED HYPERLIPIDEMIA: ICD-10-CM

## 2024-05-28 RX ORDER — ATORVASTATIN CALCIUM 10 MG/1
TABLET, FILM COATED ORAL
Qty: 90 TABLET | Refills: 1 | OUTPATIENT
Start: 2024-05-28

## 2024-05-29 RX ORDER — DIAPER,BRIEF,ADULT, DISPOSABLE
100 EACH MISCELLANEOUS DAILY
Qty: 30 TABLET | Refills: 0 | OUTPATIENT
Start: 2024-05-29

## 2024-05-29 RX ORDER — CEPHRADINE 500 MG
2 CAPSULE ORAL 3 TIMES DAILY
Refills: 1 | OUTPATIENT
Start: 2024-05-29

## 2024-06-10 RX ORDER — CILOSTAZOL 50 MG/1
50 TABLET ORAL 2 TIMES DAILY
Qty: 180 TABLET | Refills: 1 | Status: SHIPPED | OUTPATIENT
Start: 2024-06-10

## 2024-06-25 ENCOUNTER — OFFICE VISIT (OUTPATIENT)
Dept: CARDIOLOGY | Facility: CLINIC | Age: 64
End: 2024-06-25
Payer: MEDICARE

## 2024-06-25 ENCOUNTER — PATIENT ROUNDING (BHMG ONLY) (OUTPATIENT)
Dept: CARDIOLOGY | Facility: CLINIC | Age: 64
End: 2024-06-25
Payer: MEDICARE

## 2024-06-25 VITALS
HEART RATE: 94 BPM | SYSTOLIC BLOOD PRESSURE: 110 MMHG | HEIGHT: 66 IN | DIASTOLIC BLOOD PRESSURE: 60 MMHG | WEIGHT: 239 LBS | BODY MASS INDEX: 38.41 KG/M2 | OXYGEN SATURATION: 97 %

## 2024-06-25 DIAGNOSIS — I10 ESSENTIAL HYPERTENSION: ICD-10-CM

## 2024-06-25 DIAGNOSIS — I25.10 CORONARY ARTERIOSCLEROSIS: ICD-10-CM

## 2024-06-25 DIAGNOSIS — I73.9 PERIPHERAL VASCULAR DISEASE WITH CLAUDICATION: Primary | ICD-10-CM

## 2024-06-25 PROCEDURE — 3078F DIAST BP <80 MM HG: CPT | Performed by: INTERNAL MEDICINE

## 2024-06-25 PROCEDURE — G2211 COMPLEX E/M VISIT ADD ON: HCPCS | Performed by: INTERNAL MEDICINE

## 2024-06-25 PROCEDURE — 3074F SYST BP LT 130 MM HG: CPT | Performed by: INTERNAL MEDICINE

## 2024-06-25 PROCEDURE — 99214 OFFICE O/P EST MOD 30 MIN: CPT | Performed by: INTERNAL MEDICINE

## 2024-06-25 NOTE — PROGRESS NOTES
[facility]  [address], [address]  [place], [place], [address]    [phone]  [email or web address]           OUTPATIENT CLINIC NOTE    Patient care team:  Patient Care Team:  Alexus Elam MD as PCP - General (Internal Medicine & Pediatrics)  Neo Vazquez MD as Consulting Physician (Pain Medicine)  Eligio Snow MD as Consulting Physician (Cardiology)         Subjective:   Chief complaint:   Chief Complaint   Patient presents with    SET PAD         Aury Silverio is a 63 y.o. female.  Cardiac focused problem list:  PAD  Arterial duplex, 10/24/2023:  Right WYATT 0.7 with multiphasic waveforms throughout. No clear focal obstructive atherosclerotic lesions noted.  Left WYATT 0.7 with multiphasic waveforms throughout. No clear focal obstructive atherosclerotic lesions noted.   CTA with runoff 12/2023: Diffuse nonobstructive plaque  Abnormal ST segment in the inferolateral leads  Dates back to at least 2014  Mild nonobstructive CAD  [facility], 2014  Hypertension   Hyperlipidemia   Type 2 diabetes mellitus   GERD  Diverticulitis   CKD   Former tobacco dependence, quit 2022    HPI:  Stable claudication and back/arthritic pains.  Denies chest pain.    Long former smoking history.  Family history of father with MI at 51.    Review of Systems:  As noted above in the HPI    PFSH:  Patient Active Problem List   Diagnosis    Coronary arteriosclerosis    Diverticulitis    Gastroesophageal reflux disease    Hyperlipidemia    HTN (hypertension)    Multiple nodules of lung    Nicotine dependence    Obesity    Renal disease    S/P colostomy takedown    Type 2 diabetes mellitus without complication, without long-term current use of insulin    Lumbar stenosis with neurogenic claudication    Spondylosis of lumbar region without myelopathy or radiculopathy    Degeneration of lumbar or lumbosacral intervertebral disc    Peripheral vascular disease with claudication     Former smoker    Gait disturbance    Physical deconditioning    Sarcopenia    Diabetic polyneuropathy associated with type 2 diabetes mellitus    Ligamentum flavum hypertrophy    Ventral hernia without obstruction or gangrene    Pericolonic abscess due to diverticulitis    Steatosis of liver    History of abnormal cervical Papanicolaou smear    Bilateral ovarian cysts         Current Outpatient Medications:     atorvastatin (LIPITOR) 20 MG tablet, Take 1 tablet by mouth Daily., Disp: 90 tablet, Rfl: 3    chlorthalidone (HYGROTON) 25 MG tablet, Take 1 tablet by mouth Daily., Disp: 90 tablet, Rfl: 1    Cholecalciferol 50 MCG (2000 UT) capsule, Take 1 capsule by mouth., Disp: , Rfl:     cilostazol (PLETAL) 50 MG tablet, TAKE 1 TABLET BY MOUTH TWICE A DAY, Disp: 180 tablet, Rfl: 1    irbesartan (AVAPRO) 300 MG tablet, Take 1 tablet by mouth Daily., Disp: 90 tablet, Rfl: 1    metFORMIN (GLUCOPHAGE) 500 MG tablet, Take 1 tablet by mouth Daily With Breakfast., Disp: 90 tablet, Rfl: 1    montelukast (SINGULAIR) 10 MG tablet, Take 1 tablet by mouth every night at bedtime., Disp: 90 tablet, Rfl: 1    Potassium 99 MG tablet, Take  by mouth., Disp: , Rfl:     varenicline (CHANTIX) 1 MG tablet, TAKE 1 TABLET BY MOUTH TWICE A DAY, Disp: 60 tablet, Rfl: 1    Allergies   Allergen Reactions    Azithromycin GI Intolerance       Social History     Socioeconomic History    Marital status:    Tobacco Use    Smoking status: Former     Current packs/day: 0.00     Average packs/day: 1 pack/day for 44.5 years (44.5 ttl pk-yrs)     Types: Cigarettes     Start date: 1978     Quit date: 2022     Years since quittin.9     Passive exposure: Past    Smokeless tobacco: Never    Tobacco comments:     Stopped smoking 15 months ago   Vaping Use    Vaping status: Never Used   Substance and Sexual Activity    Alcohol use: Not Currently     Comment: rare    Drug use: Never    Sexual activity: Yes     Partners: Male     Birth  "control/protection: Post-menopausal       Objective:   Physical Exam:  /60 (BP Location: Left arm, Patient Position: Sitting, Cuff Size: Adult)   Pulse 94   Ht 167.6 cm (66\")   Wt 108 kg (239 lb)   SpO2 97%   BMI 38.58 kg/m²   CONSTITUTIONAL: No acute distress  CARDIOVASCULAR: Regular rate and rhythm with normal S1 and S2. Without murmur.  PERIPHERAL VASCULAR: No carotid bruit bilaterally.  Normal radial pulse. There is no lower extremity edema bilaterally.    6/2024 exam: Difficult to palpate pedal pulses.  Spider veins and mild varicosities noted      Labs:  Labs reviewed by myself    No results found for: \"CHOL\"  Lab Results   Component Value Date    TRIG 88 01/05/2024     Lab Results   Component Value Date    HDL 38 (L) 01/05/2024     Lab Results   Component Value Date    LDL 54 01/05/2024     No components found for: \"LDLDIRECTC\"    Diagnostic Data:    Procedures        Assessment and Plan:     Peripheral arterial disease  Claudication  -Multilevel ABIs in October (1 year since arterial duplex)  -Continue cilostazol which has helped partially.  Deferred increasing cilostazol for now.  Patient noted a new headache over the last 2 months, but does not know if it is related to the cilostazol  -Continue statin    Abnormal EKG  Mild nonobstructive CAD  Mixed hyperlipidemia  -Continue current medications and lifestyle/risk factor modification  -Clinical monitoring for cardiac symptoms    Hypertension  -Continue current related medication    - Return in about 1 year (around 6/25/2025) for Next scheduled follow-up with an ECG.      "

## 2024-06-25 NOTE — PROGRESS NOTES
June 25, 2024    Hello, may I speak with Aury Silverio?    My name is ALEE TRAORE      I am  with [facility]  [address]  [facility] [id number] [phone].    Before we get started may I verify your date of birth? 1960    I am calling to officially welcome you to our practice and ask about your recent visit. Is this a good time to talk? yes    Tell me about your visit with us. What things went well?  EVERYTHING WENT WELL. THE OFFICE RUNS LIKE A WELL OILED MACHINE.        We're always looking for ways to make our patients' experiences even better. Do you have recommendations on ways we may improve?  no    Overall were you satisfied with your first visit to our practice? yes       I appreciate you taking the time to speak with me today. Is there anything else I can do for you? no      Thank you, and have a great day.

## 2024-07-06 ENCOUNTER — OFFICE VISIT (OUTPATIENT)
Dept: FAMILY MEDICINE CLINIC | Facility: CLINIC | Age: 64
End: 2024-07-06
Payer: MEDICARE

## 2024-07-06 VITALS
DIASTOLIC BLOOD PRESSURE: 84 MMHG | WEIGHT: 240.19 LBS | OXYGEN SATURATION: 97 % | HEIGHT: 66 IN | SYSTOLIC BLOOD PRESSURE: 126 MMHG | TEMPERATURE: 98.2 F | BODY MASS INDEX: 38.6 KG/M2 | HEART RATE: 96 BPM

## 2024-07-06 DIAGNOSIS — R10.84 GENERALIZED ABDOMINAL PAIN: Primary | ICD-10-CM

## 2024-07-06 PROBLEM — R10.9 ABDOMINAL PAIN: Status: ACTIVE | Noted: 2024-07-06

## 2024-07-06 LAB
BILIRUB BLD-MCNC: NEGATIVE MG/DL
CLARITY, POC: CLEAR
COLOR UR: YELLOW
EXPIRATION DATE: NORMAL
GLUCOSE UR STRIP-MCNC: NEGATIVE MG/DL
KETONES UR QL: NEGATIVE
LEUKOCYTE EST, POC: NEGATIVE
Lab: NORMAL
NITRITE UR-MCNC: NEGATIVE MG/ML
PH UR: 7 [PH] (ref 5–8)
PROT UR STRIP-MCNC: NEGATIVE MG/DL
RBC # UR STRIP: NEGATIVE /UL
SP GR UR: 1.01 (ref 1–1.03)
UROBILINOGEN UR QL: NORMAL

## 2024-07-06 PROCEDURE — 3051F HG A1C>EQUAL 7.0%<8.0%: CPT | Performed by: NURSE PRACTITIONER

## 2024-07-06 PROCEDURE — 1160F RVW MEDS BY RX/DR IN RCRD: CPT | Performed by: NURSE PRACTITIONER

## 2024-07-06 PROCEDURE — 1159F MED LIST DOCD IN RCRD: CPT | Performed by: NURSE PRACTITIONER

## 2024-07-06 PROCEDURE — 81003 URINALYSIS AUTO W/O SCOPE: CPT | Performed by: NURSE PRACTITIONER

## 2024-07-06 PROCEDURE — 3079F DIAST BP 80-89 MM HG: CPT | Performed by: NURSE PRACTITIONER

## 2024-07-06 PROCEDURE — 3074F SYST BP LT 130 MM HG: CPT | Performed by: NURSE PRACTITIONER

## 2024-07-06 PROCEDURE — 1125F AMNT PAIN NOTED PAIN PRSNT: CPT | Performed by: NURSE PRACTITIONER

## 2024-07-06 PROCEDURE — 99213 OFFICE O/P EST LOW 20 MIN: CPT | Performed by: NURSE PRACTITIONER

## 2024-07-06 RX ORDER — AMOXICILLIN AND CLAVULANATE POTASSIUM 875; 125 MG/1; MG/1
1 TABLET, FILM COATED ORAL 3 TIMES DAILY
Qty: 30 TABLET | Refills: 0 | Status: SHIPPED | OUTPATIENT
Start: 2024-07-06

## 2024-07-06 NOTE — PROGRESS NOTES
"Chief Complaint  Abdominal Pain    Subjective          Aury Silverio presents to [facility]  Abdominal Pain  Associated symptoms include diarrhea. Pertinent negatives include no constipation, dysuria, fever, frequency, hematuria, nausea, vomiting or weight loss.       Patient states for the past week or so she has had generalized abdominal pain.  States she is really noticed it more in the left mid and upper quadrant though.  States at times she does have some bloating.  Sometimes it is diarrhea sometimes it is for her stool.  No fever no chills.  States she is concerned for diverticulitis.  States about 2 years ago she had a severe case of diverticulitis that resulted in a perforated colon.  States in the past she did Augmentin for this and it did fine for her.  No urinary issues.  No chest pain no chest pressure no shortness of breath no trouble breathing.  No nausea no vomiting.  States she does have a gastroenterologist and she plans on calling them next week to get an appointment scheduled ASAP to discuss this further.  States she has not really had a flareup of this since her episode 2 and half years ago.  This is Saturday clinic so her resources are currently limited and she understands this.    Objective   Vital Signs:   /84   Pulse 96   Temp 98.2 °F (36.8 °C)   Ht 167.6 cm (66\")   Wt 109 kg (240 lb 3 oz)   SpO2 97%   BMI 38.77 kg/m²     Body mass index is 38.77 kg/m².    Review of Systems   Constitutional:  Negative for chills, fatigue, fever, unexpected weight gain and unexpected weight loss.   Eyes:  Negative for visual disturbance.   Respiratory:  Negative for cough, shortness of breath and wheezing.    Cardiovascular:  Negative for chest pain and palpitations.   Gastrointestinal:  Positive for abdominal pain and diarrhea. Negative for abdominal distention, constipation, nausea and vomiting.   Genitourinary:  Negative for decreased urine volume, " dysuria, frequency, hematuria and urgency.   Musculoskeletal:  Negative for back pain.   Neurological:  Negative for headache.       Past History:  Medical History: has a past medical history of Allergic, Allergies, Diabetes, Diabetic polyneuropathy associated with type 2 diabetes mellitus (2023), Diverticulosis, Hypercholesterolemia, Hypertension, Low back pain, Lumbar stenosis with neurogenic claudication (2023), Lumbosacral disc disease, Shingles, and Spondylosis of lumbar region without myelopathy or radiculopathy (2023).   Surgical History: has a past surgical history that includes Colectomy partial / total; Cholecystectomy; Hernia repair; Inguinal hernia repair; Colonoscopy; Colon surgery;  section; and Coronary angioplasty ().   Family History: family history includes Breast cancer (age of onset: 50) in her sister; Cancer in her mother and sister; Heart disease in her father; Hypertension in her father.   Social History: reports that she quit smoking about 2 years ago. Her smoking use included cigarettes. She started smoking about 46 years ago. She has a 44.5 pack-year smoking history. She has been exposed to tobacco smoke. She has never used smokeless tobacco. She reports that she does not currently use alcohol. She reports that she does not use drugs.    PHQ-2 Depression Screening  Little interest or pleasure in doing things?     Feeling down, depressed, or hopeless?     PHQ-2 Total Score          PHQ-9 Depression Screening  Little interest or pleasure in doing things?     Feeling down, depressed, or hopeless?     Trouble falling or staying asleep, or sleeping too much?     Feeling tired or having little energy?     Poor appetite or overeating?     Feeling bad about yourself - or that you are a failure or have let yourself or your family down?     Trouble concentrating on things, such as reading the newspaper or watching television?     Moving or speaking so slowly that other  people could have noticed? Or the opposite - being so fidgety or restless that you have been moving around a lot more than usual?     Thoughts that you would be better off dead, or of hurting yourself in some way?     PHQ-9 Total Score     If you checked off any problems, how difficult have these problems made it for you to do your work, take care of things at home, or get along with other people?       PHQ-9 Total Score:        Patient screened positive for depression based on a PHQ-9 score of 0 on 12/29/2023. Follow-up recommendations include:          Current Outpatient Medications:     atorvastatin (LIPITOR) 20 MG tablet, Take 1 tablet by mouth Daily., Disp: 90 tablet, Rfl: 3    chlorthalidone (HYGROTON) 25 MG tablet, Take 1 tablet by mouth Daily., Disp: 90 tablet, Rfl: 1    Cholecalciferol 50 MCG (2000 UT) capsule, Take 1 capsule by mouth., Disp: , Rfl:     cilostazol (PLETAL) 50 MG tablet, TAKE 1 TABLET BY MOUTH TWICE A DAY, Disp: 180 tablet, Rfl: 1    irbesartan (AVAPRO) 300 MG tablet, Take 1 tablet by mouth Daily., Disp: 90 tablet, Rfl: 1    metFORMIN (GLUCOPHAGE) 500 MG tablet, Take 1 tablet by mouth Daily With Breakfast., Disp: 90 tablet, Rfl: 1    montelukast (SINGULAIR) 10 MG tablet, Take 1 tablet by mouth every night at bedtime., Disp: 90 tablet, Rfl: 1    Potassium 99 MG tablet, Take  by mouth., Disp: , Rfl:     varenicline (CHANTIX) 1 MG tablet, TAKE 1 TABLET BY MOUTH TWICE A DAY, Disp: 60 tablet, Rfl: 1    amoxicillin-clavulanate (AUGMENTIN) 875-125 MG per tablet, Take 1 tablet by mouth 3 (Three) Times a Day., Disp: 30 tablet, Rfl: 0   (Not in a hospital admission)     Allergies: Azithromycin    Physical Exam  Constitutional:       Appearance: Normal appearance.   Cardiovascular:      Rate and Rhythm: Normal rate and regular rhythm.      Heart sounds: Normal heart sounds.   Pulmonary:      Effort: Pulmonary effort is normal.      Breath sounds: Normal breath sounds.   Abdominal:      General: Abdomen  is flat. Bowel sounds are normal. There is no distension.      Palpations: Abdomen is soft. There is no mass.      Tenderness: There is abdominal tenderness. There is no right CVA tenderness, left CVA tenderness, guarding or rebound.      Hernia: No hernia is present.      Comments: Mild tenderness present to left upper quadrant, left mid quadrant.  States she does not have a gallbladder.   Neurological:      General: No focal deficit present.      Mental Status: She is alert and oriented to person, place, and time. Mental status is at baseline.   Psychiatric:         Mood and Affect: Mood normal.         Behavior: Behavior normal.         Thought Content: Thought content normal.         Judgment: Judgment normal.          Result Review :                   Assessment and Plan    Diagnoses and all orders for this visit:    1. Generalized abdominal pain (Primary)  Assessment & Plan:  This is Saturday clinic or resources are limited.  We are unable to draw blood.  I am unable to get a CT scan ordered for today.  Patient understands the risks limitations of waiting until Monday to CT scan.  She states that if she worsens at all she will immediately go to the hospital to be evaluated.  We will go ahead and put her on a round of Augmentin.  Will place order for CT scan for her to hopefully have on Monday morning.  Informed her to call afterwards for results.  She will contact her gastroenterologist Monday morning to get appointment scheduled with them.  Proper diet discussed and encouraged.  Stay hydrated with water.  Risk of meds discussed and understood.  Again, patient understands the risks of waiting until Monday to evaluate this further.  She knows to go to the emergency department if anything worsens at all.  Return to clinic or ED with any issues or concerns.    Orders:  -     Cancel: CT Abdomen Pelvis Without Contrast; Future  -     amoxicillin-clavulanate (AUGMENTIN) 875-125 MG per tablet; Take 1 tablet by mouth  3 (Three) Times a Day.  Dispense: 30 tablet; Refill: 0  -     CT Abdomen Pelvis Without Contrast; Future  -     POC Urinalysis Dipstick, Automated; Future                      Follow Up   Return if symptoms worsen or fail to improve.  Patient was given instructions and counseling regarding her condition or for health maintenance advice. Please see specific information pulled into the AVS if appropriate.     JUD Galo

## 2024-07-06 NOTE — ASSESSMENT & PLAN NOTE
This is Saturday clinic or resources are limited.  We are unable to draw blood.  I am unable to get a CT scan ordered for today.  Patient understands the risks limitations of waiting until Monday to CT scan.  She states that if she worsens at all she will immediately go to the hospital to be evaluated.  We will go ahead and put her on a round of Augmentin.  Will place order for CT scan for her to hopefully have on Monday morning.  Informed her to call afterwards for results.  She will contact her gastroenterologist Monday morning to get appointment scheduled with them.  Proper diet discussed and encouraged.  Stay hydrated with water.  Risk of meds discussed and understood.  Again, patient understands the risks of waiting until Monday to evaluate this further.  She knows to go to the emergency department if anything worsens at all.  Return to clinic or ED with any issues or concerns.

## 2024-07-09 ENCOUNTER — TELEPHONE (OUTPATIENT)
Dept: FAMILY MEDICINE CLINIC | Facility: CLINIC | Age: 64
End: 2024-07-09

## 2024-07-09 NOTE — TELEPHONE ENCOUNTER
PATIENT HAS CALLED REQUESTING A CALL BACK TO GET CLARIFICATION ON HER CT SCAN ORDER.    CALL BACK NUMBER [phone]

## 2024-07-12 ENCOUNTER — OFFICE VISIT (OUTPATIENT)
Dept: FAMILY MEDICINE CLINIC | Facility: CLINIC | Age: 64
End: 2024-07-12
Payer: COMMERCIAL

## 2024-07-12 VITALS
BODY MASS INDEX: 38.57 KG/M2 | HEIGHT: 66 IN | TEMPERATURE: 98.2 F | WEIGHT: 240 LBS | SYSTOLIC BLOOD PRESSURE: 126 MMHG | OXYGEN SATURATION: 96 % | HEART RATE: 106 BPM | DIASTOLIC BLOOD PRESSURE: 68 MMHG

## 2024-07-12 DIAGNOSIS — Z87.19 HISTORY OF DIVERTICULITIS: ICD-10-CM

## 2024-07-12 DIAGNOSIS — K57.10 DUODENAL DIVERTICULUM: Primary | ICD-10-CM

## 2024-07-12 DIAGNOSIS — E11.9 TYPE 2 DIABETES MELLITUS WITHOUT COMPLICATION, WITHOUT LONG-TERM CURRENT USE OF INSULIN: ICD-10-CM

## 2024-07-12 DIAGNOSIS — R10.84 GENERALIZED ABDOMINAL PAIN: ICD-10-CM

## 2024-07-12 LAB
EXPIRATION DATE: ABNORMAL
HBA1C MFR BLD: 6.9 % (ref 4.5–5.7)
Lab: ABNORMAL

## 2024-07-12 PROCEDURE — 83036 HEMOGLOBIN GLYCOSYLATED A1C: CPT | Performed by: INTERNAL MEDICINE

## 2024-07-12 PROCEDURE — 99213 OFFICE O/P EST LOW 20 MIN: CPT | Performed by: INTERNAL MEDICINE

## 2024-07-12 NOTE — LETTER
July 12, 2024    Aury Danielid  [address] [address]        If you have not already done so, I recommend and encourage that you request to receive the following vaccines at your pharmacy:      Shingrix (Shingles vaccine)  Tdap (Tetanus  and whooping cough booster)  COVID-19 vaccines (you may need the newest bi-valent booster)     They can administer this at your convenience without a prescription.    If you have already received these from your pharmacy or another physician, or if you get them in the future please be sure to provide us a copy of your immunization record so that we can update your records.    You can have your pharmacy fax them to the number above or find the exact date of your vaccines and bring them to your next appointment.                         Alexus Elam MD

## 2024-07-12 NOTE — LETTER
July 12, 2024    Aury Danielid  [address] [id number]        It is extremely important that patients with diabetes have regular eye exams even if the diabetes is well controlled.   Please make sure your eye doctor knows that you have diabetes.    We do not have record of a recent eye exam. Please contact your eye doctor and have them fax your most recent diabetic eye exam to our office at [phone].    If you need a new referral to an eye doctor please let me know.      Sincerely,      MD Alexus Pittman MD

## 2024-07-12 NOTE — PROGRESS NOTES
Office Note     Name: Aury Silverio    : 1960     MRN: [id number]     Chief Complaint  Abdominal Pain (Pt complains of lower abd pain. ) and Diarrhea (Pt complains of diarrhea. )    Subjective     History of Present Illness:  Aury Silverio is a 63 y.o. female who presents today for:  Answers submitted by the patient for this visit:  Office Visit on 2024 11:15 AM with Alexus Boyd (Submitted on 2024)  Please describe your symptoms.: Diverticulitis - Tenderness in abdomen/Nausea/Change in stool habits  Have you had these symptoms before?: Yes  How long have you been having these symptoms?: 1-2 weeks  Please list any medications you are currently taking for this condition.: Augmentin  Please describe any probable cause for these symptoms. : Diverticulitis  Primary Reason for Visit (Submitted on 2024)  What is the primary reason for your visit?: Other      DM: lats A1C was 7.0, LDL was 54, HDL 38, TC  109      Follows with Dr Snow and is planning for a doppler of the lower extremities and considering adjusting blood thinner    Has been seen recently for severe gas and foul smelling gas, felt like she had stool that needed to come out but would not,  stool was small in caliber and amount but soft.  No blood in the stool.  Had chills but no overt fevers.  Was having  severe nausea regardless of food intake.    Has not been to GI in over 2 years  Review of Systems:   Review of Systems    Past Medical History:   Past Medical History:   Diagnosis Date    Allergic     Allergies     Diabetes     Diabetic polyneuropathy associated with type 2 diabetes mellitus 2023    Diverticulosis     Hypercholesterolemia     Hypertension     Low back pain     Lumbar stenosis with neurogenic claudication 2023    Lumbosacral disc disease     Shingles     Spondylosis of lumbar region without myelopathy or radiculopathy 2023       Past Surgical History:   Past  Surgical History:   Procedure Laterality Date     SECTION      1995    CHOLECYSTECTOMY      2022    COLECTOMY PARTIAL / TOTAL      COLON SURGERY      10/2021    COLONOSCOPY      2022    CORONARY ANGIOPLASTY      dr Pacheco    HERNIA REPAIR      2022    INGUINAL HERNIA REPAIR         Family History:   Family History   Problem Relation Age of Onset    Cancer Mother     Heart disease Father     Hypertension Father     Breast cancer Sister 50    Cancer Sister         Breast cancer    Ovarian cancer Neg Hx        Social History:   Social History     Socioeconomic History    Marital status:    Tobacco Use    Smoking status: Former     Current packs/day: 0.00     Average packs/day: 1 pack/day for 44.5 years (44.5 ttl pk-yrs)     Types: Cigarettes     Start date: 1978     Quit date: 2022     Years since quittin.0     Passive exposure: Past    Smokeless tobacco: Never    Tobacco comments:     Stopped smoking 15 months ago   Vaping Use    Vaping status: Never Used   Substance and Sexual Activity    Alcohol use: Not Currently     Comment: rare    Drug use: Never    Sexual activity: Yes     Partners: Male     Birth control/protection: Post-menopausal       Immunizations:   Immunization History   Administered Date(s) Administered    COVID-19 (MODERNA) 1st,2nd,3rd Dose Monovalent 2021, 2021    COVID-19 (MODERNA) Monovalent Original Booster 2022    COVID-19 F23 (MODERNA) 12YRS+ (SPIKEVAX) 10/20/2022    Fluzone (or Fluarix & Flulaval for VFC) >6mos 2022    Fluzone High-Dose 65+yrs 2023    Pneumococcal Conjugate 20-Valent (PCV20) 2023        Medications:     Current Outpatient Medications:     amoxicillin-clavulanate (AUGMENTIN) 875-125 MG per tablet, Take 1 tablet by mouth 3 (Three) Times a Day., Disp: 30 tablet, Rfl: 0    atorvastatin (LIPITOR) 20 MG tablet, Take 1 tablet by mouth Daily., Disp: 90 tablet, Rfl: 3    chlorthalidone (HYGROTON) 25 MG  "tablet, Take 1 tablet by mouth Daily., Disp: 90 tablet, Rfl: 1    Cholecalciferol 50 MCG (2000 UT) capsule, Take 1 capsule by mouth., Disp: , Rfl:     cilostazol (PLETAL) 50 MG tablet, TAKE 1 TABLET BY MOUTH TWICE A DAY, Disp: 180 tablet, Rfl: 1    irbesartan (AVAPRO) 300 MG tablet, Take 1 tablet by mouth Daily., Disp: 90 tablet, Rfl: 1    metFORMIN (GLUCOPHAGE) 500 MG tablet, Take 1 tablet by mouth Daily With Breakfast., Disp: 90 tablet, Rfl: 1    montelukast (SINGULAIR) 10 MG tablet, Take 1 tablet by mouth every night at bedtime., Disp: 90 tablet, Rfl: 1    Potassium 99 MG tablet, Take  by mouth., Disp: , Rfl:     Allergies:   Allergies   Allergen Reactions    Azithromycin GI Intolerance       Objective     Vital Signs  /68   Pulse 106   Temp 98.2 °F (36.8 °C) (Oral)   Ht 167.6 cm (66\")   Wt 109 kg (240 lb)   SpO2 96%   BMI 38.74 kg/m²   Estimated body mass index is 38.74 kg/m² as calculated from the following:    Height as of this encounter: 167.6 cm (66\").    Weight as of this encounter: 109 kg (240 lb).            Physical Exam  Vitals and nursing note reviewed.   Constitutional:       Appearance: Normal appearance.   Cardiovascular:      Rate and Rhythm: Normal rate and regular rhythm.      Heart sounds: No murmur heard.     No friction rub. No gallop.   Pulmonary:      Effort: Pulmonary effort is normal.      Breath sounds: Normal breath sounds. No wheezing, rhonchi or rales.   Neurological:      Mental Status: She is alert.            Procedures     Assessment and Plan     CT ABDOMEN PELVIS WO CONTRAST Date of Exam: 7/10/2024 2:52 PM EDT     Indication: abdominal pain, history diverticulitis.    Findings:  LUNG BASES:  Unremarkable without mass or infiltrate.   LIVER: There is decreased attenuation of the liver consistent with hepatic steatosis. A 5.4 cm round water density lesion in the posterior right lobe is consistent with a benign cyst.  BILIARY/GALLBLADDER: Surgically absent.  SPLEEN:  " Unremarkable  PANCREAS:  Unremarkable  ADRENAL:  Unremarkable  KIDNEYS:  Unremarkable parenchyma with no solid mass identified. No obstruction.  No calculus identified.  GASTROINTESTINAL/MESENTERY:  No evidence of obstruction nor inflammation.  There is a duodenal diverticulum. No evidence of appendicitis.  AORTA/IVC: There is prominent atherosclerotic calcification of the abdominal aorta and iliac arteries.     RETROPERITONEUM/LYMPH NODES:  Unremarkable  REPRODUCTIVE: The uterus appears atrophic, otherwise unremarkable.  BLADDER:  Unremarkable  OSSEUS STRUCTURES: There is multilevel lumbar degenerative disc disease and facet arthropathy. No fracture or destructive osseous lesion.   There is scarring in the left lower abdominal wall which may be due to prior ostomy.     IMPRESSION:  Impression:     1. No acute CT abnormality in the abdomen or pelvis.  2. Hepatic steatosis with benign-appearing right hepatic cyst.  3. Cholecystectomy.     Diagnoses:    ICD-10-CM ICD-9-CM   1. Duodenal diverticulum  K57.10 562.00   2. Generalized abdominal pain  R10.84 789.07   3. History of diverticulitis  Z87.19 V12.70   4. Type 2 diabetes mellitus without complication, without long-term current use of insulin  E11.9 250.00       Plan:  1. Duodenal diverticulum  Patient will attempt to get back in with Dr. Patterson her GI doctor with .  Patient given in a timely manner to let me know and we can refer her to [facility] or [facility].  2. Generalized abdominal pain  As above    3. History of diverticulitis  As above    4. Type 2 diabetes mellitus without complication, without long-term current use of insulin  - POC Glycosylated Hemoglobin (Hb A1C)       Follow Up  No follow-ups on file.    Patient was advised to call the office or seek medical care if  any issues discussed during this visit worsen or persist or if new concerns arise        MD HARRISON George [facility]  [address] [place] [address]  [place] [address] [phone]

## 2024-08-10 DIAGNOSIS — I10 PRIMARY HYPERTENSION: ICD-10-CM

## 2024-08-10 DIAGNOSIS — E11.9 TYPE 2 DIABETES MELLITUS WITHOUT COMPLICATION, WITHOUT LONG-TERM CURRENT USE OF INSULIN: ICD-10-CM

## 2024-08-14 DIAGNOSIS — E11.9 TYPE 2 DIABETES MELLITUS WITHOUT COMPLICATION, WITHOUT LONG-TERM CURRENT USE OF INSULIN: ICD-10-CM

## 2024-08-14 DIAGNOSIS — I10 PRIMARY HYPERTENSION: ICD-10-CM

## 2024-08-15 RX ORDER — CHLORTHALIDONE 25 MG/1
25 TABLET ORAL DAILY
Qty: 90 TABLET | Refills: 1 | Status: SHIPPED | OUTPATIENT
Start: 2024-08-15 | End: 2024-08-16

## 2024-08-16 RX ORDER — CHLORTHALIDONE 25 MG/1
25 TABLET ORAL DAILY
Qty: 90 TABLET | Refills: 1 | Status: SHIPPED | OUTPATIENT
Start: 2024-08-16

## 2024-08-27 NOTE — PROGRESS NOTES
Your kidney function was mildly abnormal so drink plenty of water and limit your Ibuprofen and Aleve. Your vitamin D was low so take 2,000 units of vitamin D daily. Your A1C is 6.0 which is controlled. Keep watching your diet for sugars and carbohydrates. Everything else looked fine. Take care!   2 = A lot of assistance

## 2024-09-19 ENCOUNTER — OFFICE VISIT (OUTPATIENT)
Dept: FAMILY MEDICINE CLINIC | Facility: CLINIC | Age: 64
End: 2024-09-19
Payer: MEDICARE

## 2024-09-19 VITALS
OXYGEN SATURATION: 98 % | TEMPERATURE: 98.4 F | BODY MASS INDEX: 39.05 KG/M2 | HEART RATE: 101 BPM | SYSTOLIC BLOOD PRESSURE: 134 MMHG | HEIGHT: 66 IN | WEIGHT: 243 LBS | DIASTOLIC BLOOD PRESSURE: 64 MMHG

## 2024-09-19 DIAGNOSIS — T50.Z95A ADVERSE EFFECT OF VACCINE, INITIAL ENCOUNTER: Primary | ICD-10-CM

## 2024-09-19 PROCEDURE — 3044F HG A1C LEVEL LT 7.0%: CPT | Performed by: INTERNAL MEDICINE

## 2024-09-19 PROCEDURE — 1125F AMNT PAIN NOTED PAIN PRSNT: CPT | Performed by: INTERNAL MEDICINE

## 2024-09-19 PROCEDURE — 3078F DIAST BP <80 MM HG: CPT | Performed by: INTERNAL MEDICINE

## 2024-09-19 PROCEDURE — 1159F MED LIST DOCD IN RCRD: CPT | Performed by: INTERNAL MEDICINE

## 2024-09-19 PROCEDURE — 1160F RVW MEDS BY RX/DR IN RCRD: CPT | Performed by: INTERNAL MEDICINE

## 2024-09-19 PROCEDURE — 99213 OFFICE O/P EST LOW 20 MIN: CPT | Performed by: INTERNAL MEDICINE

## 2024-09-19 PROCEDURE — 3075F SYST BP GE 130 - 139MM HG: CPT | Performed by: INTERNAL MEDICINE

## 2024-10-22 DIAGNOSIS — J30.1 ALLERGIC RHINITIS DUE TO POLLEN, UNSPECIFIED SEASONALITY: ICD-10-CM

## 2024-10-22 RX ORDER — MONTELUKAST SODIUM 10 MG/1
10 TABLET ORAL
Qty: 90 TABLET | Refills: 0 | Status: SHIPPED | OUTPATIENT
Start: 2024-10-22

## 2024-10-25 ENCOUNTER — HOSPITAL ENCOUNTER (OUTPATIENT)
Dept: CARDIOLOGY | Facility: HOSPITAL | Age: 64
Discharge: HOME | End: 2024-10-25
Payer: MEDICARE

## 2024-10-25 VITALS — HEIGHT: 66 IN | BODY MASS INDEX: 39.05 KG/M2 | WEIGHT: 243 LBS

## 2024-10-25 DIAGNOSIS — I73.9 PERIPHERAL VASCULAR DISEASE WITH CLAUDICATION: ICD-10-CM

## 2024-10-25 PROCEDURE — 93923 UPR/LXTR ART STDY 3+ LVLS: CPT

## 2024-10-28 ENCOUNTER — TELEPHONE (OUTPATIENT)
Dept: CARDIOLOGY | Facility: CLINIC | Age: 64
End: 2024-10-28
Payer: MEDICARE

## 2024-10-28 LAB
BH CV LOWER ARTERIAL LEFT ABI RATIO: 0.8
BH CV LOWER ARTERIAL LEFT CALF RATIO: 0.77
BH CV LOWER ARTERIAL LEFT DORSALIS PEDIS SYS MAX: 129
BH CV LOWER ARTERIAL LEFT GREAT TOE SYS MAX: 111
BH CV LOWER ARTERIAL LEFT POPLITEAL SYS MAX: 124
BH CV LOWER ARTERIAL LEFT POST TIBIAL SYS MAX: 115
BH CV LOWER ARTERIAL LEFT TBI RATIO: 0.69
BH CV LOWER ARTERIAL RIGHT ABI RATIO: 0.8
BH CV LOWER ARTERIAL RIGHT CALF RATIO: 0.95
BH CV LOWER ARTERIAL RIGHT DORSALIS PEDIS SYS MAX: 129
BH CV LOWER ARTERIAL RIGHT GREAT TOE SYS MAX: 160
BH CV LOWER ARTERIAL RIGHT POPLITEAL SYS MAX: 154
BH CV LOWER ARTERIAL RIGHT POST TIBIAL SYS MAX: 120
BH CV LOWER ARTERIAL RIGHT TBI RATIO: 0.99
UPPER ARTERIAL LEFT ARM BRACHIAL SYS MAX: 150
UPPER ARTERIAL RIGHT ARM BRACHIAL SYS MAX: 162

## 2024-10-28 NOTE — TELEPHONE ENCOUNTER
----- Message from Eligio Snow sent at 10/28/2024  7:53 AM EDT -----  Repeat ABIs look very stable.  No significant change.  Overall evidence of mild plaque.  Continue current plan  ----- Message -----  From: Eligio Snow MD  Sent: 10/28/2024   7:16 AM EDT  To: Eligio Snow MD

## 2024-11-13 ENCOUNTER — OFFICE VISIT (OUTPATIENT)
Dept: FAMILY MEDICINE CLINIC | Facility: CLINIC | Age: 64
End: 2024-11-13
Payer: COMMERCIAL

## 2024-11-13 VITALS
BODY MASS INDEX: 38.62 KG/M2 | DIASTOLIC BLOOD PRESSURE: 68 MMHG | OXYGEN SATURATION: 99 % | WEIGHT: 240.3 LBS | HEART RATE: 75 BPM | HEIGHT: 66 IN | SYSTOLIC BLOOD PRESSURE: 132 MMHG

## 2024-11-13 DIAGNOSIS — E11.9 TYPE 2 DIABETES MELLITUS WITHOUT COMPLICATION, WITHOUT LONG-TERM CURRENT USE OF INSULIN: Primary | ICD-10-CM

## 2024-11-13 DIAGNOSIS — I10 PRIMARY HYPERTENSION: ICD-10-CM

## 2024-11-13 DIAGNOSIS — E78.2 MIXED HYPERLIPIDEMIA: ICD-10-CM

## 2024-11-13 LAB — HBA1C MFR BLD: 6.8 % (ref 4.5–5.7)

## 2024-11-13 RX ORDER — ATORVASTATIN CALCIUM 20 MG/1
20 TABLET, FILM COATED ORAL DAILY
Qty: 90 TABLET | Refills: 3 | Status: SHIPPED | OUTPATIENT
Start: 2024-11-13

## 2024-11-13 RX ORDER — IRBESARTAN 300 MG/1
300 TABLET ORAL DAILY
Qty: 90 TABLET | Refills: 1 | Status: SHIPPED | OUTPATIENT
Start: 2024-11-13

## 2024-11-13 NOTE — PROGRESS NOTES
Office Note     Name: Aury Silverio    : 1960     MRN: [id number]     Chief Complaint  Diabetes (Follow up med recheck)    Subjective     History of Present Illness:  Aury Silverio is a 64 y.o. female who presents today for:    History of Present Illness  The patient is a 64-year-old female who presents for a follow-up on diabetes.    She has been managing her diabetes with metformin, taken once daily, for an extended period. She reports no issues with non-healing cuts or sores, persistent infections, or kidney problems. Additionally, she does not experience any leg swelling.    A recent Doppler test conducted by her cardiologist showed no changes from the previous year.    She underwent a colonoscopy in 2024, during which a few polyps were found but returned normal results. She was advised to have another colonoscopy in two years.    She has been informed that due to her sciatic and partially herniated disc, which affect the nerves controlling the bladder, pelvic floor, balance, and sphincter, she may continue to experience issues. She is currently dealing with constipation and diarrhea but is not using any stool softeners.    She is nearing the need for a refill of her losartan prescription. Her blood thinner was refilled by Dr. Guerra, and she receives her Lipitor refills from this office.      Review of Systems:   Review of Systems    Past Medical History:   Past Medical History:   Diagnosis Date    Allergic     Allergies     Diabetes     Diabetic polyneuropathy associated with type 2 diabetes mellitus 2023    Diverticulosis     Hypercholesterolemia     Hypertension     Low back pain     Lumbar stenosis with neurogenic claudication 2023    Lumbosacral disc disease     Shingles     Spondylosis of lumbar region without myelopathy or radiculopathy 2023       Past Surgical History:   Past Surgical History:   Procedure Laterality Date      SECTION      1995    CHOLECYSTECTOMY      2022    COLECTOMY PARTIAL / TOTAL      COLON SURGERY      10/2021    COLONOSCOPY      2022    CORONARY ANGIOPLASTY      dr Pacheco    HERNIA REPAIR      2022    INGUINAL HERNIA REPAIR         Family History:   Family History   Problem Relation Age of Onset    Cancer Mother     Heart disease Father     Hypertension Father     Breast cancer Sister 50    Cancer Sister         Breast cancer    Ovarian cancer Neg Hx        Social History:   Social History     Socioeconomic History    Marital status:    Tobacco Use    Smoking status: Former     Current packs/day: 0.00     Average packs/day: 1 pack/day for 44.5 years (44.5 ttl pk-yrs)     Types: Cigarettes     Start date: 1978     Quit date: 2022     Years since quittin.3     Passive exposure: Past    Smokeless tobacco: Never    Tobacco comments:     Stopped smoking 15 months ago   Vaping Use    Vaping status: Never Used   Substance and Sexual Activity    Alcohol use: Not Currently     Comment: rare    Drug use: Never    Sexual activity: Yes     Partners: Male     Birth control/protection: Post-menopausal       Immunizations:   Immunization History   Administered Date(s) Administered    COVID-19 (MODERNA) 12YRS+ (SPIKEVAX) 10/20/2022    COVID-19 (MODERNA) 1st,2nd,3rd Dose Monovalent 2021, 2021    COVID-19 (MODERNA) Monovalent Original Booster 2022    Fluzone (or Fluarix & Flulaval for VFC) >6mos 2022    Fluzone High-Dose 65+yrs 2023    Pneumococcal Conjugate 20-Valent (PCV20) 2023    Td (TDVAX) 2024        Medications:     Current Outpatient Medications:     atorvastatin (LIPITOR) 20 MG tablet, Take 1 tablet by mouth Daily., Disp: 90 tablet, Rfl: 3    chlorthalidone (HYGROTON) 25 MG tablet, Take 1 tablet by mouth Daily., Disp: 90 tablet, Rfl: 1    Cholecalciferol 50 MCG ( UT) capsule, Take 1 capsule by mouth., Disp: , Rfl:     cilostazol (PLETAL)  "50 MG tablet, TAKE 1 TABLET BY MOUTH TWICE A DAY, Disp: 180 tablet, Rfl: 1    irbesartan (AVAPRO) 300 MG tablet, Take 1 tablet by mouth Daily., Disp: 90 tablet, Rfl: 1    metFORMIN (GLUCOPHAGE) 500 MG tablet, TAKE 1 TABLET BY MOUTH EVERY DAY WITH BREAKFAST, Disp: 90 tablet, Rfl: 1    montelukast (SINGULAIR) 10 MG tablet, TAKE 1 TABLET BY MOUTH EVERYDAY AT BEDTIME, Disp: 90 tablet, Rfl: 0    Potassium 99 MG tablet, Take  by mouth., Disp: , Rfl:     Allergies:   Allergies   Allergen Reactions    Shingrix [Zoster Vac Recomb Adjuvanted] Swelling    Azithromycin GI Intolerance       Objective     Vital Signs  /68   Pulse 75   Ht 167.6 cm (66\")   Wt 109 kg (240 lb 4.8 oz)   SpO2 99%   BMI 38.79 kg/m²   Estimated body mass index is 38.79 kg/m² as calculated from the following:    Height as of this encounter: 167.6 cm (66\").    Weight as of this encounter: 109 kg (240 lb 4.8 oz).    Vital Signs were reviewed.          Physical Exam  Vitals and nursing note reviewed.   Constitutional:       Appearance: Normal appearance.   Cardiovascular:      Rate and Rhythm: Normal rate and regular rhythm.      Heart sounds: No murmur heard.     No friction rub. No gallop.   Pulmonary:      Effort: Pulmonary effort is normal.      Breath sounds: Normal breath sounds. No wheezing, rhonchi or rales.   Neurological:      Mental Status: She is alert.        Physical Exam         Results  Laboratory Studies  A1c was 7 at the end of last year.    Procedures     Assessment and Plan     Diagnoses:    ICD-10-CM ICD-9-CM   1. Type 2 diabetes mellitus without complication, without long-term current use of insulin  E11.9 250.00   2. Primary hypertension  I10 401.9   3. Mixed hyperlipidemia  E78.2 272.2       Plan:    1. Type 2 diabetes mellitus without complication, without long-term current use of insulin    - POCT glycated hemoglobin, total    2. Primary hypertension    - irbesartan (AVAPRO) 300 MG tablet; Take 1 tablet by mouth Daily.  " Dispense: 90 tablet; Refill: 1    3. Mixed hyperlipidemia    - atorvastatin (LIPITOR) 20 MG tablet; Take 1 tablet by mouth Daily.  Dispense: 90 tablet; Refill: 3       Assessment & Plan  1. Diabetes Mellitus.  An A1c test will be conducted today to monitor her blood sugar levels. If the results fall between 6 and 7, no changes will be made to her current treatment plan. A comprehensive set of labs will be ordered at the beginning of the next year. She is currently taking metformin once a day.     2. Hypertension.  Her blood pressure readings are within the normal range. A prescription for losartan has been refilled.    3. Hyperlipidemia.  A prescription for Lipitor (atorvastatin) has been refilled.    4. Medication Management.  Refills for montelukast and blood thinner have been confirmed.     5. Health Maintenance.  She has been advised to receive her influenza vaccine at the pharmacy.    Follow-up  Return in 3 months for follow up.         Follow Up  Return in about 3 months (around 2/13/2025) for Medicare Wellness FASTING.    Patient was advised to call the office or seek medical care if  any issues discussed during this visit worsen or persist or if new concerns arise    Patient or patient representative verbalized consent for the use of Ambient Listening during the visit with  Alexus Elam MD for chart documentation. 11/13/2024  14:48 EST    Alexus Elam MD  [facility]  [address] [place] [id number] [phone]  Answers submitted by the patient for this visit:  Other (Submitted on 11/7/2024)  Please describe your symptoms.: follow up ???  Have you had these symptoms before?: Yes  How long have you been having these symptoms?: Greater than 2 weeks  Primary Reason for Visit (Submitted on 11/7/2024)  What is the primary reason for your visit?: Problem Not Listed

## 2024-12-05 RX ORDER — CILOSTAZOL 50 MG/1
50 TABLET ORAL 2 TIMES DAILY
Qty: 180 TABLET | Refills: 1 | Status: SHIPPED | OUTPATIENT
Start: 2024-12-05

## 2024-12-08 DIAGNOSIS — E11.9 TYPE 2 DIABETES MELLITUS WITHOUT COMPLICATION, WITHOUT LONG-TERM CURRENT USE OF INSULIN: ICD-10-CM

## 2025-01-27 ENCOUNTER — OFFICE VISIT (OUTPATIENT)
Dept: FAMILY MEDICINE CLINIC | Facility: CLINIC | Age: 65
End: 2025-01-27
Payer: MEDICARE

## 2025-01-27 VITALS
DIASTOLIC BLOOD PRESSURE: 74 MMHG | SYSTOLIC BLOOD PRESSURE: 136 MMHG | HEIGHT: 66 IN | BODY MASS INDEX: 38.09 KG/M2 | WEIGHT: 237 LBS

## 2025-01-27 DIAGNOSIS — R21 RASH: Primary | ICD-10-CM

## 2025-01-27 PROCEDURE — 3078F DIAST BP <80 MM HG: CPT | Performed by: NURSE PRACTITIONER

## 2025-01-27 PROCEDURE — 1160F RVW MEDS BY RX/DR IN RCRD: CPT | Performed by: NURSE PRACTITIONER

## 2025-01-27 PROCEDURE — 1125F AMNT PAIN NOTED PAIN PRSNT: CPT | Performed by: NURSE PRACTITIONER

## 2025-01-27 PROCEDURE — 1159F MED LIST DOCD IN RCRD: CPT | Performed by: NURSE PRACTITIONER

## 2025-01-27 PROCEDURE — 99213 OFFICE O/P EST LOW 20 MIN: CPT | Performed by: NURSE PRACTITIONER

## 2025-01-27 PROCEDURE — 3075F SYST BP GE 130 - 139MM HG: CPT | Performed by: NURSE PRACTITIONER

## 2025-01-27 NOTE — PROGRESS NOTES
"Chief Complaint  Rash (Pt complains of ongoing rash. Has concerns it is with a medication. )    Subjective          Aury Silverio presents to [facility]  History of Present Illness  Pt has had an itchy, painful rash on her body for the past year. She felt that the onset was after starting Pletal for PAD. She stopped this 2 weeks ago and she feels that the rash is improving.   Rash  Pertinent negatives include no fatigue, fever or shortness of breath.       History of Present Illness      Objective   Vital Signs:   /74   Ht 167.6 cm (66\")   Wt 108 kg (237 lb)   BMI 38.25 kg/m²     Body mass index is 38.25 kg/m².    Review of Systems   Constitutional:  Negative for fatigue and fever.   Respiratory:  Negative for shortness of breath.    Cardiovascular:  Negative for chest pain, palpitations and leg swelling.   Skin:  Positive for rash.   Neurological:  Negative for syncope.   Psychiatric/Behavioral:  The patient is not nervous/anxious.           Current Outpatient Medications:     atorvastatin (LIPITOR) 20 MG tablet, Take 1 tablet by mouth Daily., Disp: 90 tablet, Rfl: 3    chlorthalidone (HYGROTON) 25 MG tablet, Take 1 tablet by mouth Daily., Disp: 90 tablet, Rfl: 1    Cholecalciferol 50 MCG (2000 UT) capsule, Take 1 capsule by mouth., Disp: , Rfl:     cilostazol (PLETAL) 50 MG tablet, TAKE 1 TABLET BY MOUTH TWICE A DAY, Disp: 180 tablet, Rfl: 1    irbesartan (AVAPRO) 300 MG tablet, Take 1 tablet by mouth Daily., Disp: 90 tablet, Rfl: 1    metFORMIN (GLUCOPHAGE) 500 MG tablet, TAKE 1 TABLET BY MOUTH EVERY DAY WITH BREAKFAST, Disp: 90 tablet, Rfl: 1    montelukast (SINGULAIR) 10 MG tablet, TAKE 1 TABLET BY MOUTH EVERYDAY AT BEDTIME, Disp: 90 tablet, Rfl: 0    Potassium 99 MG tablet, Take  by mouth., Disp: , Rfl:       Allergies: Shingrix [zoster vac recomb adjuvanted] and Azithromycin    Physical Exam  Constitutional:       Appearance: Normal appearance.   HENT:    "   Head: Normocephalic.   Eyes:      Conjunctiva/sclera: Conjunctivae normal.      Pupils: Pupils are equal, round, and reactive to light.   Cardiovascular:      Rate and Rhythm: Normal rate and regular rhythm.      Heart sounds: Normal heart sounds.   Pulmonary:      Effort: Pulmonary effort is normal.      Breath sounds: Normal breath sounds.   Abdominal:      Tenderness: There is no abdominal tenderness.   Musculoskeletal:         General: Normal range of motion.   Skin:     General: Skin is warm and dry.      Capillary Refill: Capillary refill takes less than 2 seconds.      Comments: Healing patches of erythema on hands.    Neurological:      General: No focal deficit present.      Mental Status: She is alert and oriented to person, place, and time.   Psychiatric:         Mood and Affect: Mood normal.         Behavior: Behavior normal.         Thought Content: Thought content normal.         Judgment: Judgment normal.          Physical Exam         Result Review :                Results            Assessment and Plan    Diagnoses and all orders for this visit:    1. Rash (Primary)  Comments:  F/U with dermatology. Use a thick, emollient lotion daily. RTC for worsened sx and if not improving in 1 week.  Orders:  -     Ambulatory Referral to Dermatology                  Follow Up   Return in about 1 week (around 2/3/2025) for if not improving or sooner if symptoms worsen.  Patient was given instructions and counseling regarding her condition or for health maintenance advice. Please see specific information pulled into the AVS if appropriate.     JUD Rizzo

## 2025-01-29 ENCOUNTER — TELEPHONE (OUTPATIENT)
Dept: FAMILY MEDICINE CLINIC | Facility: CLINIC | Age: 65
End: 2025-01-29
Payer: MEDICARE

## 2025-01-29 NOTE — TELEPHONE ENCOUNTER
"  Caller: Aury Silverio \"Ludmila\"    Relationship: Self    Best call back number: [phone]     What is the best time to reach you: ANYTIME     Who are you requesting to speak with (clinical staff, provider,  specific staff member): REFERRAL COORDINATOR     Do you know the name of the person who called: THE PATIENT LUDMILA     What was the call regarding: STATUS OF REFERRAL FOR DERMATOLOGIST     Is it okay if the provider responds through MyChart: YES  "

## 2025-02-14 ENCOUNTER — OFFICE VISIT (OUTPATIENT)
Dept: FAMILY MEDICINE CLINIC | Facility: CLINIC | Age: 65
End: 2025-02-14
Payer: MEDICARE

## 2025-02-14 VITALS
HEIGHT: 66 IN | HEART RATE: 82 BPM | DIASTOLIC BLOOD PRESSURE: 78 MMHG | OXYGEN SATURATION: 98 % | SYSTOLIC BLOOD PRESSURE: 134 MMHG | WEIGHT: 236 LBS | BODY MASS INDEX: 37.93 KG/M2

## 2025-02-14 DIAGNOSIS — Z87.891 PERSONAL HISTORY OF TOBACCO USE: ICD-10-CM

## 2025-02-14 DIAGNOSIS — E78.2 MIXED HYPERLIPIDEMIA: ICD-10-CM

## 2025-02-14 DIAGNOSIS — E11.9 TYPE 2 DIABETES MELLITUS WITHOUT COMPLICATION, WITHOUT LONG-TERM CURRENT USE OF INSULIN: ICD-10-CM

## 2025-02-14 DIAGNOSIS — Z00.00 MEDICARE ANNUAL WELLNESS VISIT, SUBSEQUENT: Primary | ICD-10-CM

## 2025-02-14 DIAGNOSIS — I10 PRIMARY HYPERTENSION: ICD-10-CM

## 2025-02-14 PROCEDURE — 1159F MED LIST DOCD IN RCRD: CPT | Performed by: INTERNAL MEDICINE

## 2025-02-14 PROCEDURE — G0439 PPPS, SUBSEQ VISIT: HCPCS | Performed by: INTERNAL MEDICINE

## 2025-02-14 PROCEDURE — 1126F AMNT PAIN NOTED NONE PRSNT: CPT | Performed by: INTERNAL MEDICINE

## 2025-02-14 PROCEDURE — 3078F DIAST BP <80 MM HG: CPT | Performed by: INTERNAL MEDICINE

## 2025-02-14 PROCEDURE — 3075F SYST BP GE 130 - 139MM HG: CPT | Performed by: INTERNAL MEDICINE

## 2025-02-14 PROCEDURE — 1160F RVW MEDS BY RX/DR IN RCRD: CPT | Performed by: INTERNAL MEDICINE

## 2025-02-14 RX ORDER — CHLORTHALIDONE 25 MG/1
25 TABLET ORAL DAILY
Qty: 90 TABLET | Refills: 1 | Status: SHIPPED | OUTPATIENT
Start: 2025-02-14

## 2025-02-14 NOTE — PROGRESS NOTES
Subjective   The ABCs of the Annual Wellness Visit  Medicare Wellness Visit      Aury Silverio is a 64 y.o. patient who presents for a Medicare Wellness Visit.    The following portions of the patient's history were reviewed and   updated as appropriate: allergies, current medications, past family history, past medical history, past social history, past surgical history, and problem list.    Compared to one year ago, the patient's physical   health is the same.  Compared to one year ago, the patient's mental   health is the same.    Recent Hospitalizations:  She was not admitted to the hospital during the last year.     Current Medical Providers:  Patient Care Team:  Alexus Elam MD as PCP - General (Internal Medicine & Pediatrics)  Neo Vazquez MD as Consulting Physician (Pain Medicine)  Eligio Snow MD as Consulting Physician (Cardiology)    Outpatient Medications Prior to Visit   Medication Sig Dispense Refill    atorvastatin (LIPITOR) 20 MG tablet Take 1 tablet by mouth Daily. 90 tablet 3    Cholecalciferol 50 MCG (2000 UT) capsule Take 1 capsule by mouth.      cilostazol (PLETAL) 50 MG tablet TAKE 1 TABLET BY MOUTH TWICE A  tablet 1    irbesartan (AVAPRO) 300 MG tablet Take 1 tablet by mouth Daily. 90 tablet 1    metFORMIN (GLUCOPHAGE) 500 MG tablet TAKE 1 TABLET BY MOUTH EVERY DAY WITH BREAKFAST 90 tablet 1    montelukast (SINGULAIR) 10 MG tablet TAKE 1 TABLET BY MOUTH EVERYDAY AT BEDTIME 90 tablet 0    Potassium 99 MG tablet Take  by mouth.      chlorthalidone (HYGROTON) 25 MG tablet Take 1 tablet by mouth Daily. 90 tablet 1     No facility-administered medications prior to visit.     No opioid medication identified on active medication list. I have reviewed chart for other potential  high risk medication/s and harmful drug interactions in the elderly.      Aspirin is not on active medication list.  Aspirin use is contraindicated for this patient due to: cilostazol.   ".    Patient Active Problem List   Diagnosis    Coronary arteriosclerosis    Diverticulitis    Gastroesophageal reflux disease    Hyperlipidemia    HTN (hypertension)    Multiple nodules of lung    Nicotine dependence    Obesity    Renal disease    S/P colostomy takedown    Type 2 diabetes mellitus without complication, without long-term current use of insulin    Lumbar stenosis with neurogenic claudication    Spondylosis of lumbar region without myelopathy or radiculopathy    Degeneration of lumbar or lumbosacral intervertebral disc    Peripheral vascular disease with claudication    Former smoker    Gait disturbance    Physical deconditioning    Sarcopenia    Diabetic polyneuropathy associated with type 2 diabetes mellitus    Ligamentum flavum hypertrophy    Ventral hernia without obstruction or gangrene    Pericolonic abscess due to diverticulitis    Steatosis of liver    History of abnormal cervical Papanicolaou smear    Bilateral ovarian cysts    Abdominal pain     Advance Care Planning Advance Directive is not on file.  ACP discussion was held with the patient during this visit. Patient does not have an advance directive, information provided.            Objective   Vitals:    02/14/25 0759   BP: 134/78   Pulse: 82   SpO2: 98%   Weight: 107 kg (236 lb)   Height: 167.6 cm (66\")   PainSc: 0-No pain       Estimated body mass index is 38.09 kg/m² as calculated from the following:    Height as of this encounter: 167.6 cm (66\").    Weight as of this encounter: 107 kg (236 lb).              Gait and Balance Evaluation:  Normal     Does the patient have evidence of cognitive impairment? No                                                                                                Health  Risk Assessment    Smoking Status:  Social History     Tobacco Use   Smoking Status Former    Current packs/day: 0.00    Average packs/day: 1 pack/day for 44.5 years (44.5 ttl pk-yrs)    Types: Cigarettes    Start date: 1/1/1978    " Quit date: 2022    Years since quittin.6    Passive exposure: Past   Smokeless Tobacco Never   Tobacco Comments    Stopped smoking 15 months ago     Alcohol Consumption:  Social History     Substance and Sexual Activity   Alcohol Use Not Currently    Comment: rare       Fall Risk Screen  STEADI Fall Risk Assessment was completed, and patient is at LOW risk for falls.Assessment completed on:2025    Depression Screening   The PHQ has not been completed during this encounter.     Health Habits and Functional and Cognitive Screenin/7/2025     2:10 PM   Functional & Cognitive Status   Do you have difficulty preparing food and eating? No    Do you have difficulty bathing yourself, getting dressed or grooming yourself? No    Do you have difficulty using the toilet? No    Do you have difficulty moving around from place to place? No    Do you have trouble with steps or getting out of a bed or a chair? No    Current Diet Well Balanced Diet    Dental Exam Up to date    Eye Exam Up to date    Exercise (times per week) 2 times per week    Current Exercises Include Walking;Yard Work    Do you need help using the phone?  No    Are you deaf or do you have serious difficulty hearing?  No    Do you need help to go to places out of walking distance? No    Do you need help shopping? No    Do you need help preparing meals?  No    Do you need help with housework?  No    Do you need help with laundry? No    Do you need help taking your medications? No    Do you need help managing money? No    Do you ever drive or ride in a car without wearing a seat belt? No    Have you felt unusual stress, anger or loneliness in the last month? No    Who do you live with? Spouse    If you need help, do you have trouble finding someone available to you? No    Have you been bothered in the last four weeks by sexual problems? No    Do you have difficulty concentrating, remembering or making decisions? No        Patient-reported            Age-appropriate Screening Schedule:  Refer to the list below for future screening recommendations based on patient's age, sex and/or medical conditions. Orders for these recommended tests are listed in the plan section. The patient has been provided with a written plan.    Health Maintenance List  Health Maintenance   Topic Date Due    URINE MICROALBUMIN-CREATININE RATIO (uACR)  Never done    LIPID PANEL  01/05/2025    LUNG CANCER SCREENING  02/12/2025    HEMOGLOBIN A1C  05/13/2025    COVID-19 Vaccine (5 - 2024-25 season) 05/06/2025 (Originally 9/1/2024)    INFLUENZA VACCINE  11/01/2025 (Originally 7/1/2024)    DIABETIC FOOT EXAM  04/08/2025    DIABETIC EYE EXAM  06/03/2025    BMI FOLLOWUP  11/13/2025    ANNUAL WELLNESS VISIT  02/14/2026    MAMMOGRAM  04/15/2026    DXA SCAN  04/15/2026    COLORECTAL CANCER SCREENING  10/09/2026    TDAP/TD VACCINES (2 - Tdap) 09/12/2034    HEPATITIS C SCREENING  Completed    Pneumococcal Vaccine 50+  Completed                                                                                                                                                CMS Preventative Services Quick Reference  Risk Factors Identified During Encounter  None Identified    The above risks/problems have been discussed with the patient.  Pertinent information has been shared with the patient in the After Visit Summary.  An After Visit Summary and PPPS were made available to the patient.    Follow Up:   Next Medicare Wellness visit to be scheduled in 1 year.         Additional E&M Note during same encounter follows:  Patient has additional, significant, and separately identifiable condition(s)/problem(s) that require work above and beyond the Medicare Wellness Visit     Chief Complaint  Annual Exam (Patient presents today for medicare wellness.)    Subjective    HPI         The patient is a 64-year-old female who presents today for her subsequent annual Medicare wellness exam. She is also due for  "fasting lab work.    She has been adhering to a fasting regimen in preparation for her scheduled blood work. She reports no recent gastrointestinal disturbances or hot flashes. There have been no recent surgical procedures, accidents, or injuries within the past year. She has not required hospitalization or overnight stays in the hospital this year. Her physical, mental, and emotional health status remains stable compared to the previous year. She does not require the use of mobility aids such as canes or walkers. She has not experienced any recent falls or fractures. She has consulted an ophthalmologist within the past year. She does not have a living will in place. She has not undergone any thyroid-related surgical interventions.    She is currently on anticoagulant therapy and does not take aspirin daily.    SOCIAL HISTORY  She used to smoke.    MEDICATIONS  Chlorthalidone          Objective   Vital Signs:  /78   Pulse 82   Ht 167.6 cm (66\")   Wt 107 kg (236 lb)   SpO2 98%   BMI 38.09 kg/m²   Physical Exam      Thyroid was examined.  Lungs were auscultated.    Vital Signs  Blood pressure is normal.            Results                Assessment and Plan        1. Annual Medicare wellness examination.  Her blood pressure readings are within the normal range today. A comprehensive panel of laboratory tests will be conducted, including A1c, cholesterol, liver function, kidney function, potassium, and sodium levels. A CT scan of the lungs will be performed to monitor for any nodules. She is advised to receive the influenza vaccine during this visit. A booklet containing information about living garcia has been provided for her reference.    2. Medication management.  A prescription refill for chlorthalidone has been sent to [facility] pharmacy.       Social History     Tobacco Use   Smoking Status Former    Current packs/day: 0.00    Average packs/day: 1 pack/day for 44.5 years (44.5 ttl pk-yrs)    Types: Cigarettes "    Start date: 1978    Quit date: 2022    Years since quittin.6    Passive exposure: Past   Smokeless Tobacco Never   Tobacco Comments    Stopped smoking 15 months ago        Patient meets criteria for LDCT screening based on smoking history. This patient does have signs or symptoms of lung cancer. Patient participated in shared decision making process and was counseled on risks and benefits of LDCT lung cancer screenings including false positives, need for additional workup including CT, PET scan or Biopsy and expresses willingness to proceed with diagnosis and treatment. Pt understands risks and benefits and agrees to proceed. Patients get the most benefit from LDCT when participating in annual screening each year.    Information on the importance of smoking cessation (quitting smoking) and refraining from restarting smoking was discussed/offered/provided during this visit.    (Medicare patients only use code )                    Follow Up   No follow-ups on file.  Patient was given instructions and counseling regarding her condition or for health maintenance advice. Please see specific information pulled into the AVS if appropriate.  Patient or patient representative verbalized consent for the use of Ambient Listening during the visit with  Alexus Elam MD for chart documentation. 2025  08:30 EST

## 2025-02-15 LAB
ALBUMIN SERPL-MCNC: 3.9 G/DL (ref 3.9–4.9)
ALBUMIN/CREAT UR: 6 MG/G CREAT (ref 0–29)
ALP SERPL-CCNC: 79 IU/L (ref 44–121)
ALT SERPL-CCNC: 11 IU/L (ref 0–32)
AST SERPL-CCNC: 14 IU/L (ref 0–40)
BILIRUB SERPL-MCNC: 0.5 MG/DL (ref 0–1.2)
BUN SERPL-MCNC: 17 MG/DL (ref 8–27)
BUN/CREAT SERPL: 15 (ref 12–28)
CALCIUM SERPL-MCNC: 9.6 MG/DL (ref 8.7–10.3)
CHLORIDE SERPL-SCNC: 101 MMOL/L (ref 96–106)
CHOLEST SERPL-MCNC: 115 MG/DL (ref 100–199)
CO2 SERPL-SCNC: 26 MMOL/L (ref 20–29)
CREAT SERPL-MCNC: 1.1 MG/DL (ref 0.57–1)
CREAT UR-MCNC: 160 MG/DL
EGFRCR SERPLBLD CKD-EPI 2021: 55 ML/MIN/1.73
GLOBULIN SER CALC-MCNC: 2.6 G/DL (ref 1.5–4.5)
GLUCOSE SERPL-MCNC: 122 MG/DL (ref 70–99)
HBA1C MFR BLD: 7 % (ref 4.8–5.6)
HDLC SERPL-MCNC: 39 MG/DL
LDLC SERPL CALC-MCNC: 61 MG/DL (ref 0–99)
MICROALBUMIN UR-MCNC: 9.4 UG/ML
POTASSIUM SERPL-SCNC: 4.3 MMOL/L (ref 3.5–5.2)
PROT SERPL-MCNC: 6.5 G/DL (ref 6–8.5)
SODIUM SERPL-SCNC: 141 MMOL/L (ref 134–144)
TRIGL SERPL-MCNC: 71 MG/DL (ref 0–149)
VLDLC SERPL CALC-MCNC: 15 MG/DL (ref 5–40)

## 2025-02-19 DIAGNOSIS — J30.1 ALLERGIC RHINITIS DUE TO POLLEN, UNSPECIFIED SEASONALITY: ICD-10-CM

## 2025-02-19 RX ORDER — MONTELUKAST SODIUM 10 MG/1
10 TABLET ORAL
Qty: 90 TABLET | Refills: 0 | Status: SHIPPED | OUTPATIENT
Start: 2025-02-19

## 2025-03-10 ENCOUNTER — OFFICE VISIT (OUTPATIENT)
Dept: FAMILY MEDICINE CLINIC | Facility: CLINIC | Age: 65
End: 2025-03-10
Payer: MEDICARE

## 2025-03-10 VITALS
WEIGHT: 236 LBS | OXYGEN SATURATION: 96 % | SYSTOLIC BLOOD PRESSURE: 132 MMHG | TEMPERATURE: 98.2 F | HEIGHT: 66 IN | HEART RATE: 66 BPM | BODY MASS INDEX: 37.93 KG/M2 | DIASTOLIC BLOOD PRESSURE: 78 MMHG

## 2025-03-10 DIAGNOSIS — J01.10 ACUTE FRONTAL SINUSITIS, RECURRENCE NOT SPECIFIED: Primary | ICD-10-CM

## 2025-03-10 PROCEDURE — 1126F AMNT PAIN NOTED NONE PRSNT: CPT | Performed by: INTERNAL MEDICINE

## 2025-03-10 PROCEDURE — 99213 OFFICE O/P EST LOW 20 MIN: CPT | Performed by: INTERNAL MEDICINE

## 2025-03-10 PROCEDURE — 3075F SYST BP GE 130 - 139MM HG: CPT | Performed by: INTERNAL MEDICINE

## 2025-03-10 PROCEDURE — 3078F DIAST BP <80 MM HG: CPT | Performed by: INTERNAL MEDICINE

## 2025-03-10 PROCEDURE — 1160F RVW MEDS BY RX/DR IN RCRD: CPT | Performed by: INTERNAL MEDICINE

## 2025-03-10 PROCEDURE — 1159F MED LIST DOCD IN RCRD: CPT | Performed by: INTERNAL MEDICINE

## 2025-03-10 PROCEDURE — 3051F HG A1C>EQUAL 7.0%<8.0%: CPT | Performed by: INTERNAL MEDICINE

## 2025-03-10 NOTE — PROGRESS NOTES
Office Note     Name: Aury Silverio    : 1960     MRN: [id number]     Chief Complaint  Sinus Problem (Patient presents today for started as sneezing now it's coughing, headache and chest congestion. Started last Thursday.)    Subjective     History of Present Illness:  Aury Silverio is a 64 y.o. female who presents today for:    History of Present Illness  The patient presents for evaluation of sinus congestion, sneezing, and cough.    She began experiencing symptoms on Thursday, characterized by a sensation of congestion in her head upon awakening. She reports the onset of chest discomfort, accompanied by sneezing, coughing, and significant head pressure during coughing episodes. Additionally, she has been experiencing fevers and body aches. Despite not being aware of any recent exposure to individuals with influenza or COVID-19, she acknowledges potential exposure at grocery stores. Her cough appeared to be improving yesterday; however, she experienced an episode of nasal congestion and difficulty breathing during the night. She reports a nonproductive cough and is not aware of any known allergies.    ALLERGIES  The patient has no known allergies.      Review of Systems:   Review of Systems    Past Medical History:   Past Medical History:   Diagnosis Date    Allergies     Diabetic polyneuropathy associated with type 2 diabetes mellitus 2023    Diverticulosis     Hyperlipidemia 10/31/2019    Low back pain     Lumbar stenosis with neurogenic claudication 2023    Lumbosacral disc disease     Shingles     Spondylosis of lumbar region without myelopathy or radiculopathy 2023    Type 2 diabetes mellitus without complication, without long-term current use of insulin 2022       Past Surgical History:   Past Surgical History:   Procedure Laterality Date     SECTION      1995    CHOLECYSTECTOMY      2022    COLECTOMY PARTIAL / TOTAL      COLON  SURGERY      10/2021    COLONOSCOPY      2022    CORONARY ANGIOPLASTY      dr Pacheco    HERNIA REPAIR      2022    INGUINAL HERNIA REPAIR         Family History:   Family History   Problem Relation Age of Onset    Cancer Mother     Heart disease Father     Hypertension Father     Breast cancer Sister 50    Cancer Sister         Breast cancer    Ovarian cancer Neg Hx        Social History:   Social History     Socioeconomic History    Marital status:    Tobacco Use    Smoking status: Former     Current packs/day: 0.00     Average packs/day: 1 pack/day for 44.5 years (44.5 ttl pk-yrs)     Types: Cigarettes     Start date: 1978     Quit date: 2022     Years since quittin.7     Passive exposure: Past    Smokeless tobacco: Never    Tobacco comments:     Stopped smoking 15 months ago   Vaping Use    Vaping status: Never Used   Substance and Sexual Activity    Alcohol use: Not Currently     Comment: rare    Drug use: Never    Sexual activity: Yes     Partners: Male     Birth control/protection: Post-menopausal       Immunizations:   Immunization History   Administered Date(s) Administered    COVID-19 (MODERNA) 12YRS+ (SPIKEVAX) 10/20/2022    COVID-19 (MODERNA) 1st,2nd,3rd Dose Monovalent 2021, 2021    COVID-19 (MODERNA) Monovalent Original Booster 2022    Fluzone (or Fluarix & Flulaval for VFC) >6mos 2022    Fluzone High-Dose 65+yrs 2023    Pneumococcal Conjugate 20-Valent (PCV20) 2023    Td (TDVAX) 2024        Medications:     Current Outpatient Medications:     atorvastatin (LIPITOR) 20 MG tablet, Take 1 tablet by mouth Daily., Disp: 90 tablet, Rfl: 3    chlorthalidone (HYGROTON) 25 MG tablet, Take 1 tablet by mouth Daily., Disp: 90 tablet, Rfl: 1    Cholecalciferol 50 MCG (2000) capsule, Take 1 capsule by mouth., Disp: , Rfl:     cilostazol (PLETAL) 50 MG tablet, TAKE 1 TABLET BY MOUTH TWICE A DAY, Disp: 180 tablet, Rfl: 1    irbesartan  "(AVAPRO) 300 MG tablet, Take 1 tablet by mouth Daily., Disp: 90 tablet, Rfl: 1    metFORMIN (GLUCOPHAGE) 500 MG tablet, TAKE 1 TABLET BY MOUTH EVERY DAY WITH BREAKFAST, Disp: 90 tablet, Rfl: 1    montelukast (SINGULAIR) 10 MG tablet, TAKE 1 TABLET BY MOUTH EVERYDAY AT BEDTIME, Disp: 90 tablet, Rfl: 0    Potassium 99 MG tablet, Take  by mouth., Disp: , Rfl:     Allergies:   Allergies   Allergen Reactions    Shingrix [Zoster Vac Recomb Adjuvanted] Swelling    Azithromycin GI Intolerance       Objective     Vital Signs  /78   Pulse 66   Temp 98.2 °F (36.8 °C) (Oral)   Ht 167.6 cm (66\")   Wt 107 kg (236 lb)   SpO2 96%   BMI 38.09 kg/m²   Estimated body mass index is 38.09 kg/m² as calculated from the following:    Height as of this encounter: 167.6 cm (66\").    Weight as of this encounter: 107 kg (236 lb).    Vital Signs were reviewed.            Physical Exam  Vitals and nursing note reviewed.   Constitutional:       Appearance: Normal appearance.   Cardiovascular:      Rate and Rhythm: Normal rate and regular rhythm.      Heart sounds: No murmur heard.     No friction rub. No gallop.   Pulmonary:      Effort: Pulmonary effort is normal.      Breath sounds: Normal breath sounds. No wheezing, rhonchi or rales.   Neurological:      Mental Status: She is alert.        Physical Exam  Ears appear normal.  Crackling sounds detected in the lungs.       Results      Procedures     Assessment and Plan     Diagnoses:    ICD-10-CM ICD-9-CM   1. Acute frontal sinusitis, recurrence not specified  J01.10 461.1       Plan:    1. Acute frontal sinusitis, recurrence not specified    - amoxicillin-clavulanate (AUGMENTIN) 875-125 MG per tablet; Take 1 tablet by mouth 2 (Two) Times a Day for 7 days.  Dispense: 14 tablet; Refill: 0       Assessment & Plan  1. Sinus infection.  The patient's pulmonary examination revealed no significant abnormalities, with the exception of a minor rattle that was subsequently cleared upon " coughing, suggesting the presence of phlegm. It is anticipated that she will experience an increase in mucus production over the next few days, which may manifest as postnasal drainage and expectoration from the chest. A prescription for antibiotics has been issued to manage the sinus infection. The prescription has been sent to [facility] in [place]. If she experiences fever, body aches, or chest pain, she is advised to contact the office immediately for a follow-up consultation.         Follow Up  Return if symptoms worsen or fail to improve.    Patient was advised to call the office or seek medical care if  any issues discussed during this visit worsen or persist or if new concerns arise    Patient or patient representative verbalized consent for the use of Ambient Listening during the visit with  Alexus Elam MD for chart documentation. 3/18/2025  09:40 EDT    Alexus Elam MD  [facility]  [address] [place] [id number]  [phone]

## 2025-03-29 ENCOUNTER — OFFICE VISIT (OUTPATIENT)
Dept: FAMILY MEDICINE CLINIC | Facility: CLINIC | Age: 65
End: 2025-03-29
Payer: MEDICARE

## 2025-03-29 VITALS
HEART RATE: 105 BPM | TEMPERATURE: 99.1 F | WEIGHT: 237 LBS | OXYGEN SATURATION: 97 % | DIASTOLIC BLOOD PRESSURE: 76 MMHG | BODY MASS INDEX: 38.25 KG/M2 | SYSTOLIC BLOOD PRESSURE: 152 MMHG

## 2025-03-29 DIAGNOSIS — U07.1 COVID-19: ICD-10-CM

## 2025-03-29 DIAGNOSIS — J01.00 ACUTE NON-RECURRENT MAXILLARY SINUSITIS: Primary | ICD-10-CM

## 2025-03-29 DIAGNOSIS — R50.9 FEVER, UNSPECIFIED FEVER CAUSE: ICD-10-CM

## 2025-03-29 LAB
EXPIRATION DATE: ABNORMAL
EXPIRATION DATE: NORMAL
FLUAV AG UPPER RESP QL IA.RAPID: NOT DETECTED
FLUBV AG UPPER RESP QL IA.RAPID: NOT DETECTED
INTERNAL CONTROL: ABNORMAL
INTERNAL CONTROL: NORMAL
Lab: ABNORMAL
Lab: NORMAL
S PYO AG THROAT QL: NEGATIVE
SARS-COV-2 AG UPPER RESP QL IA.RAPID: DETECTED

## 2025-03-29 PROCEDURE — 99214 OFFICE O/P EST MOD 30 MIN: CPT | Performed by: NURSE PRACTITIONER

## 2025-03-29 PROCEDURE — 3051F HG A1C>EQUAL 7.0%<8.0%: CPT | Performed by: NURSE PRACTITIONER

## 2025-03-29 PROCEDURE — 87428 SARSCOV & INF VIR A&B AG IA: CPT | Performed by: NURSE PRACTITIONER

## 2025-03-29 PROCEDURE — 1126F AMNT PAIN NOTED NONE PRSNT: CPT | Performed by: NURSE PRACTITIONER

## 2025-03-29 PROCEDURE — 3077F SYST BP >= 140 MM HG: CPT | Performed by: NURSE PRACTITIONER

## 2025-03-29 PROCEDURE — 3078F DIAST BP <80 MM HG: CPT | Performed by: NURSE PRACTITIONER

## 2025-03-29 PROCEDURE — 87880 STREP A ASSAY W/OPTIC: CPT | Performed by: NURSE PRACTITIONER

## 2025-03-29 RX ORDER — DOXYCYCLINE 100 MG/1
100 CAPSULE ORAL 2 TIMES DAILY
Qty: 20 CAPSULE | Refills: 0 | Status: SHIPPED | OUTPATIENT
Start: 2025-03-29

## 2025-03-29 NOTE — PROGRESS NOTES
Chief Complaint  URI (Pt complains of cough, congestion, drainage, fever, chills and headache onset 3 weeks. )    Wilmer Silverio presents to [facility]  History of Present Illness  Pt has had cough and congestion x 3-4 weeks. She finished augmentin but does not feel that her symptoms resolved. She developed a new fever, chills, body aches, sore throat, cough and congestion on Thursday. She denies known sick contacts.   URI   Associated symptoms include congestion and coughing. Pertinent negatives include no chest pain, diarrhea, nausea or vomiting.       History of Present Illness      Objective   Vital Signs:   /76   Pulse 105   Temp 99.1 °F (37.3 °C) (Oral)   Wt 108 kg (237 lb)   SpO2 97%   BMI 38.25 kg/m²     Body mass index is 38.25 kg/m².    Review of Systems   Constitutional:  Negative for fatigue and fever.   HENT:  Positive for congestion and sinus pressure.    Respiratory:  Positive for cough. Negative for shortness of breath.    Cardiovascular:  Negative for chest pain, palpitations and leg swelling.   Gastrointestinal:  Negative for diarrhea, nausea and vomiting.   Neurological:  Negative for syncope.   Psychiatric/Behavioral:  The patient is not nervous/anxious.           Current Outpatient Medications:     atorvastatin (LIPITOR) 20 MG tablet, Take 1 tablet by mouth Daily., Disp: 90 tablet, Rfl: 3    chlorthalidone (HYGROTON) 25 MG tablet, Take 1 tablet by mouth Daily., Disp: 90 tablet, Rfl: 1    Cholecalciferol 50 MCG (2000 UT) capsule, Take 1 capsule by mouth., Disp: , Rfl:     cilostazol (PLETAL) 50 MG tablet, TAKE 1 TABLET BY MOUTH TWICE A DAY, Disp: 180 tablet, Rfl: 1    irbesartan (AVAPRO) 300 MG tablet, Take 1 tablet by mouth Daily., Disp: 90 tablet, Rfl: 1    metFORMIN (GLUCOPHAGE) 500 MG tablet, TAKE 1 TABLET BY MOUTH EVERY DAY WITH BREAKFAST, Disp: 90 tablet, Rfl: 1    montelukast (SINGULAIR) 10 MG tablet, TAKE 1 TABLET  BY MOUTH EVERYDAY AT BEDTIME, Disp: 90 tablet, Rfl: 0    Potassium 99 MG tablet, Take  by mouth., Disp: , Rfl:     doxycycline (VIBRAMYCIN) 100 MG capsule, Take 1 capsule by mouth 2 (Two) Times a Day., Disp: 20 capsule, Rfl: 0      Allergies: Shingrix [zoster vac recomb adjuvanted] and Azithromycin    Physical Exam  Constitutional:       Appearance: Normal appearance.   HENT:      Right Ear: Tympanic membrane and ear canal normal.      Left Ear: Tympanic membrane and ear canal normal.      Nose: Nose normal.      Mouth/Throat:      Pharynx: Posterior oropharyngeal erythema present.   Eyes:      Extraocular Movements: Extraocular movements intact.      Conjunctiva/sclera: Conjunctivae normal.      Pupils: Pupils are equal, round, and reactive to light.   Cardiovascular:      Rate and Rhythm: Normal rate and regular rhythm.      Heart sounds: Normal heart sounds.   Pulmonary:      Effort: Pulmonary effort is normal. No accessory muscle usage.      Breath sounds: Normal breath sounds.   Lymphadenopathy:      Cervical: No cervical adenopathy.   Skin:     General: Skin is warm and dry.   Neurological:      General: No focal deficit present.      Mental Status: She is alert.   Psychiatric:         Mood and Affect: Mood normal.         Behavior: Behavior normal.         Thought Content: Thought content normal.         Judgment: Judgment normal.          Physical Exam         Result Review :                Results            Assessment and Plan    Diagnoses and all orders for this visit:    1. Acute non-recurrent maxillary sinusitis (Primary)  Comments:  Finish antibiotics. Mucinex and Zyrtec PRN. Increase fluids. RTC for worsened sx and if not improving in 1 week.  Orders:  -     doxycycline (VIBRAMYCIN) 100 MG capsule; Take 1 capsule by mouth 2 (Two) Times a Day.  Dispense: 20 capsule; Refill: 0    2. Fever, unspecified fever cause  -     POCT SARS-CoV-2 + Flu Antigen ROBERTH  -     POC Rapid Strep A    3.  COVID-19  Comments:  Increase fluids. Mucinex, Zyrtec, and Delsym PRN. Pt declined Paxlovid. RTC for worsened sx and if not improving in 1 week.                  Follow Up   No follow-ups on file.  Patient was given instructions and counseling regarding her condition or for health maintenance advice. Please see specific information pulled into the AVS if appropriate.     JUD Rizzo

## 2025-05-03 DIAGNOSIS — I10 PRIMARY HYPERTENSION: ICD-10-CM

## 2025-05-04 DIAGNOSIS — I10 PRIMARY HYPERTENSION: ICD-10-CM

## 2025-05-05 RX ORDER — IRBESARTAN 300 MG/1
300 TABLET ORAL DAILY
Qty: 90 TABLET | Refills: 0 | Status: SHIPPED | OUTPATIENT
Start: 2025-05-05

## 2025-05-05 RX ORDER — CHLORTHALIDONE 25 MG/1
25 TABLET ORAL DAILY
Qty: 90 TABLET | Refills: 0 | Status: SHIPPED | OUTPATIENT
Start: 2025-05-05

## 2025-05-21 DIAGNOSIS — J30.1 ALLERGIC RHINITIS DUE TO POLLEN, UNSPECIFIED SEASONALITY: ICD-10-CM

## 2025-05-21 RX ORDER — MONTELUKAST SODIUM 10 MG/1
10 TABLET ORAL
Qty: 90 TABLET | Refills: 0 | Status: SHIPPED | OUTPATIENT
Start: 2025-05-21

## 2025-05-24 DIAGNOSIS — E11.9 TYPE 2 DIABETES MELLITUS WITHOUT COMPLICATION, WITHOUT LONG-TERM CURRENT USE OF INSULIN: ICD-10-CM

## 2025-05-30 RX ORDER — CILOSTAZOL 50 MG/1
50 TABLET ORAL 2 TIMES DAILY
Qty: 180 TABLET | Refills: 1 | Status: SHIPPED | OUTPATIENT
Start: 2025-05-30

## 2025-08-02 DIAGNOSIS — I10 PRIMARY HYPERTENSION: ICD-10-CM

## 2025-08-04 ENCOUNTER — OFFICE VISIT (OUTPATIENT)
Dept: CARDIOLOGY | Facility: CLINIC | Age: 65
End: 2025-08-04
Payer: MEDICARE

## 2025-08-04 VITALS
DIASTOLIC BLOOD PRESSURE: 72 MMHG | BODY MASS INDEX: 38.83 KG/M2 | OXYGEN SATURATION: 98 % | SYSTOLIC BLOOD PRESSURE: 110 MMHG | HEIGHT: 66 IN | WEIGHT: 241.6 LBS | HEART RATE: 94 BPM

## 2025-08-04 DIAGNOSIS — E78.2 MIXED HYPERLIPIDEMIA: ICD-10-CM

## 2025-08-04 DIAGNOSIS — I10 ESSENTIAL HYPERTENSION: ICD-10-CM

## 2025-08-04 DIAGNOSIS — I73.9 PERIPHERAL VASCULAR DISEASE WITH CLAUDICATION: Primary | ICD-10-CM

## 2025-08-04 DIAGNOSIS — I25.10 CORONARY ARTERIOSCLEROSIS: ICD-10-CM

## 2025-08-04 PROCEDURE — G2211 COMPLEX E/M VISIT ADD ON: HCPCS | Performed by: INTERNAL MEDICINE

## 2025-08-04 PROCEDURE — 3074F SYST BP LT 130 MM HG: CPT | Performed by: INTERNAL MEDICINE

## 2025-08-04 PROCEDURE — 99214 OFFICE O/P EST MOD 30 MIN: CPT | Performed by: INTERNAL MEDICINE

## 2025-08-04 PROCEDURE — 3078F DIAST BP <80 MM HG: CPT | Performed by: INTERNAL MEDICINE

## 2025-08-04 RX ORDER — IRBESARTAN 300 MG/1
300 TABLET ORAL DAILY
Qty: 90 TABLET | Refills: 0 | Status: SHIPPED | OUTPATIENT
Start: 2025-08-04

## 2025-08-16 DIAGNOSIS — J30.1 ALLERGIC RHINITIS DUE TO POLLEN, UNSPECIFIED SEASONALITY: ICD-10-CM

## 2025-08-20 RX ORDER — MONTELUKAST SODIUM 10 MG/1
10 TABLET ORAL
Qty: 90 TABLET | Refills: 0 | Status: SHIPPED | OUTPATIENT
Start: 2025-08-20

## 2025-08-27 DIAGNOSIS — E11.9 TYPE 2 DIABETES MELLITUS WITHOUT COMPLICATION, WITHOUT LONG-TERM CURRENT USE OF INSULIN: ICD-10-CM
